# Patient Record
Sex: FEMALE | Race: WHITE | NOT HISPANIC OR LATINO | URBAN - METROPOLITAN AREA
[De-identification: names, ages, dates, MRNs, and addresses within clinical notes are randomized per-mention and may not be internally consistent; named-entity substitution may affect disease eponyms.]

---

## 2019-11-11 ENCOUNTER — EMERGENCY (EMERGENCY)
Facility: HOSPITAL | Age: 74
LOS: 0 days | Discharge: HOME | End: 2019-11-12
Attending: EMERGENCY MEDICINE | Admitting: EMERGENCY MEDICINE
Payer: MEDICAID

## 2019-11-11 VITALS
OXYGEN SATURATION: 99 % | SYSTOLIC BLOOD PRESSURE: 184 MMHG | HEART RATE: 71 BPM | DIASTOLIC BLOOD PRESSURE: 82 MMHG | RESPIRATION RATE: 20 BRPM | TEMPERATURE: 98 F

## 2019-11-11 DIAGNOSIS — I10 ESSENTIAL (PRIMARY) HYPERTENSION: ICD-10-CM

## 2019-11-11 DIAGNOSIS — R07.89 OTHER CHEST PAIN: ICD-10-CM

## 2019-11-11 DIAGNOSIS — Z88.8 ALLERGY STATUS TO OTHER DRUGS, MEDICAMENTS AND BIOLOGICAL SUBSTANCES STATUS: ICD-10-CM

## 2019-11-11 DIAGNOSIS — R11.0 NAUSEA: ICD-10-CM

## 2019-11-11 DIAGNOSIS — Z90.710 ACQUIRED ABSENCE OF BOTH CERVIX AND UTERUS: Chronic | ICD-10-CM

## 2019-11-11 DIAGNOSIS — Z88.2 ALLERGY STATUS TO SULFONAMIDES: ICD-10-CM

## 2019-11-11 DIAGNOSIS — E03.9 HYPOTHYROIDISM, UNSPECIFIED: ICD-10-CM

## 2019-11-11 DIAGNOSIS — R07.9 CHEST PAIN, UNSPECIFIED: ICD-10-CM

## 2019-11-11 DIAGNOSIS — R42 DIZZINESS AND GIDDINESS: ICD-10-CM

## 2019-11-11 DIAGNOSIS — R06.02 SHORTNESS OF BREATH: ICD-10-CM

## 2019-11-11 DIAGNOSIS — R51 HEADACHE: ICD-10-CM

## 2019-11-11 LAB
ALBUMIN SERPL ELPH-MCNC: 4.4 G/DL — SIGNIFICANT CHANGE UP (ref 3.5–5.2)
ALP SERPL-CCNC: 66 U/L — SIGNIFICANT CHANGE UP (ref 30–115)
ALT FLD-CCNC: 10 U/L — SIGNIFICANT CHANGE UP (ref 0–41)
ANION GAP SERPL CALC-SCNC: 12 MMOL/L — SIGNIFICANT CHANGE UP (ref 7–14)
AST SERPL-CCNC: 12 U/L — SIGNIFICANT CHANGE UP (ref 0–41)
BILIRUB SERPL-MCNC: 0.4 MG/DL — SIGNIFICANT CHANGE UP (ref 0.2–1.2)
BUN SERPL-MCNC: 17 MG/DL — SIGNIFICANT CHANGE UP (ref 10–20)
CALCIUM SERPL-MCNC: 9.7 MG/DL — SIGNIFICANT CHANGE UP (ref 8.5–10.1)
CHLORIDE SERPL-SCNC: 100 MMOL/L — SIGNIFICANT CHANGE UP (ref 98–110)
CO2 SERPL-SCNC: 29 MMOL/L — SIGNIFICANT CHANGE UP (ref 17–32)
CREAT SERPL-MCNC: 0.6 MG/DL — LOW (ref 0.7–1.5)
GLUCOSE SERPL-MCNC: 148 MG/DL — HIGH (ref 70–99)
HCT VFR BLD CALC: 41.7 % — SIGNIFICANT CHANGE UP (ref 37–47)
HGB BLD-MCNC: 13.4 G/DL — SIGNIFICANT CHANGE UP (ref 12–16)
MAGNESIUM SERPL-MCNC: 1.7 MG/DL — LOW (ref 1.8–2.4)
MCHC RBC-ENTMCNC: 27 PG — SIGNIFICANT CHANGE UP (ref 27–31)
MCHC RBC-ENTMCNC: 32.1 G/DL — SIGNIFICANT CHANGE UP (ref 32–37)
MCV RBC AUTO: 84.1 FL — SIGNIFICANT CHANGE UP (ref 81–99)
NRBC # BLD: 0 /100 WBCS — SIGNIFICANT CHANGE UP (ref 0–0)
NT-PROBNP SERPL-SCNC: 31 PG/ML — SIGNIFICANT CHANGE UP (ref 0–300)
PLATELET # BLD AUTO: 189 K/UL — SIGNIFICANT CHANGE UP (ref 130–400)
POTASSIUM SERPL-MCNC: 4.6 MMOL/L — SIGNIFICANT CHANGE UP (ref 3.5–5)
POTASSIUM SERPL-SCNC: 4.6 MMOL/L — SIGNIFICANT CHANGE UP (ref 3.5–5)
PROT SERPL-MCNC: 7.6 G/DL — SIGNIFICANT CHANGE UP (ref 6–8)
RBC # BLD: 4.96 M/UL — SIGNIFICANT CHANGE UP (ref 4.2–5.4)
RBC # FLD: 13.5 % — SIGNIFICANT CHANGE UP (ref 11.5–14.5)
SODIUM SERPL-SCNC: 141 MMOL/L — SIGNIFICANT CHANGE UP (ref 135–146)
TROPONIN T SERPL-MCNC: <0.01 NG/ML — SIGNIFICANT CHANGE UP
WBC # BLD: 4.35 K/UL — LOW (ref 4.8–10.8)
WBC # FLD AUTO: 4.35 K/UL — LOW (ref 4.8–10.8)

## 2019-11-11 PROCEDURE — 93010 ELECTROCARDIOGRAM REPORT: CPT

## 2019-11-11 PROCEDURE — 71275 CT ANGIOGRAPHY CHEST: CPT | Mod: 26

## 2019-11-11 PROCEDURE — 99220: CPT

## 2019-11-11 PROCEDURE — 71045 X-RAY EXAM CHEST 1 VIEW: CPT | Mod: 26

## 2019-11-11 PROCEDURE — 70450 CT HEAD/BRAIN W/O DYE: CPT | Mod: 26

## 2019-11-11 RX ORDER — DIPHENHYDRAMINE HCL 50 MG
50 CAPSULE ORAL ONCE
Refills: 0 | Status: COMPLETED | OUTPATIENT
Start: 2019-11-11 | End: 2019-11-11

## 2019-11-11 RX ORDER — ASPIRIN/CALCIUM CARB/MAGNESIUM 324 MG
325 TABLET ORAL ONCE
Refills: 0 | Status: COMPLETED | OUTPATIENT
Start: 2019-11-11 | End: 2019-11-11

## 2019-11-11 RX ORDER — MAGNESIUM SULFATE 500 MG/ML
2 VIAL (ML) INJECTION ONCE
Refills: 0 | Status: COMPLETED | OUTPATIENT
Start: 2019-11-11 | End: 2019-11-11

## 2019-11-11 RX ADMIN — Medication 50 MILLIGRAM(S): at 22:18

## 2019-11-11 RX ADMIN — Medication 50 GRAM(S): at 22:00

## 2019-11-11 RX ADMIN — Medication 325 MILLIGRAM(S): at 23:16

## 2019-11-11 NOTE — ED PROVIDER NOTE - PROGRESS NOTE DETAILS
Resident Price Statement: I have personally seen and examined this patient. I have fully participated in the care of this patient. I have reviewed all pertinent clinical information, including history, physical exam, plan and the Medical Student's note and agree except as noted.

## 2019-11-11 NOTE — ED ADULT NURSE NOTE - OBJECTIVE STATEMENT
Pt c/o headache, dizziness, left sided facial numbness x 2 days. Pt alert/ oriented. No neuro defecits noted. Pt AOX4.

## 2019-11-11 NOTE — ED PROVIDER NOTE - PHYSICAL EXAMINATION
CONSTITUTIONAL: Well-developed; well-nourished; in no acute distress.   SKIN: warm, dry  HEAD: Normocephalic; atraumatic.  EYES: no conjunctival injection. PERRL.   ENT: No nasal discharge; airway clear.  NECK: Supple; non tender. No JVD or tracheal deviation.   CARD: S1, S2 normal; no murmurs, gallops, or rubs. Regular rate and rhythm.   RESP: No wheezes, rales or rhonchi.  ABD: soft ntnd, no guarding, no distension or rigidity   EXT: Ambulates independently. Able to move all 4 limbs.   LYMPH: No acute cervical adenopathy.  NEURO: Alert, oriented, grossly unremarkable. Cranial nerves II-XII intact. Neuro exam normal. Muscle strength 5/5 in upper and lower extremities.   PSYCH: Cooperative, appropriate.

## 2019-11-11 NOTE — ED PROVIDER NOTE - ATTENDING CONTRIBUTION TO CARE
Declines , uses daughters. 73yoF with h/o HTN, HLD, hypothyr, presents with CP/SOB and dizziness. States she has been having years of L sided chest sharpness/heaviness radiating to back, now for a few days is worsened with exertion. Associated BELLO. Also reports vertigo with onset at the same time as her CP. Nonsmoker, uncertain FHx of CAD though none of her siblings have CAD, never had cardiac w/u and no PMD here. Denies vomiting, leg pain or swelling, fever, or any other symptoms. On exam, afebrile, hemodynamically stable, saturating well, NAD, well appearing, head NCAT, PERRL, EOMI, no nystagmus, anicteric, MMM, no JVD, RRR, nml S1/S2, no m/r/g, lungs CTAB, no w/r/r, abd soft, NT, ND, nml BS, no rebound or guarding, AAO, CN's 3-12 intact, motor 5/5 and sens symm in all extrems, F-N/Romberg's/gait nml, HOWARD spontaneously, no leg cyanosis or edema, skin warm, well perfused, no rashes or hives. Character low suspicion for PE and no associated signs of DVT. Character/appearance of low suspicion for dissection and CTA _______________. Character also low suspicion for central process and no focal or localizing signs, NIH 0. No e/o PTX or PNA. Character of some concern for ACS, HEART score 3, will obs for concern for ACS. Declines , uses daughters. 73yoF with h/o HTN, HLD, hypothyr, presents with CP/SOB and dizziness. States she has been having years of L sided chest sharpness/heaviness radiating to back, now for a few days is worsened with exertion. Associated BELLO. Also reports vertigo with onset at the same time as her CP. Nonsmoker, uncertain FHx of CAD though none of her siblings have CAD, never had cardiac w/u and no PMD here. Denies vomiting, leg pain or swelling, fever, or any other symptoms. On exam, afebrile, hemodynamically stable, saturating well, NAD, well appearing, head NCAT, PERRL, EOMI, no nystagmus, anicteric, MMM, no JVD, RRR, nml S1/S2, no m/r/g, lungs CTAB, no w/r/r, abd soft, NT, ND, nml BS, no rebound or guarding, AAO, CN's 3-12 intact, motor 5/5 and sens symm in all extrems, F-N/Romberg's/gait nml, HOWARD spontaneously, no leg cyanosis or edema, skin warm, well perfused, no rashes or hives. Character low suspicion for PE and no associated signs of DVT. Character/appearance of low suspicion for dissection and CTA negative. Character also low suspicion for central process and no focal or localizing signs, NIH 0. No e/o PTX or PNA. Character of some concern for ACS, HEART score 3, will obs for concern for ACS. Patient well appearing, hemodynamically stable. Given ASA. Sent to obs.

## 2019-11-11 NOTE — ED PROVIDER NOTE - NS ED ROS FT
CONSTITUTIONAL - No fever, No diaphoresis, No weight change  EYES - No blurred vision  ENT - No change in hearing, No sore throat, No neck pain, No rhinorrhea, No ear pain  RESPIRATORY  + shortness of breath, No cough  CARDIAC + chest pain, No palpitations  GI - No abdominal pain, No nausea, No vomiting, No diarrhea, No constipation, No flank pain  - No dysuria, frequency  MUSCULOSKELETAL - No joint paint, No swelling, No back pain  NEUROLOGIC + numbness left half of upper and lower lips, No focal weakness, + headache, + dizziness

## 2019-11-11 NOTE — ED CDU PROVIDER INITIAL DAY NOTE - OBJECTIVE STATEMENT
74y/o female with pmh of hypothyroidism, htn, pt. c/o left sided exertional cp radiating to the back and associated with sob and dizziness for several days. pt. also c/o headache for a month. denies fever, chills, cough, vomiting, abdominal pain. pt. never smoker, no cardiologist. no prior cardiac workup.

## 2019-11-11 NOTE — ED CDU PROVIDER INITIAL DAY NOTE - MEDICAL DECISION MAKING DETAILS
Patient to remain on continuous telemetry.  For repeat cardiac enzymes and repeat EKG.  Provocative testing in the AM.

## 2019-11-11 NOTE — ED PROVIDER NOTE - CLINICAL SUMMARY MEDICAL DECISION MAKING FREE TEXT BOX
Declines , uses daughters. 73yoF with h/o HTN, HLD, hypothyr, presents with CP/SOB and dizziness. States she has been having years of L sided chest sharpness/heaviness radiating to back, now for a few days is worsened with exertion. Associated BELLO. Also reports vertigo with onset at the same time as her CP. Nonsmoker, uncertain FHx of CAD though none of her siblings have CAD, never had cardiac w/u and no PMD here. Denies vomiting, leg pain or swelling, fever, or any other symptoms. On exam, afebrile, hemodynamically stable, saturating well, NAD, well appearing, head NCAT, PERRL, EOMI, no nystagmus, anicteric, MMM, no JVD, RRR, nml S1/S2, no m/r/g, lungs CTAB, no w/r/r, abd soft, NT, ND, nml BS, no rebound or guarding, AAO, CN's 3-12 intact, motor 5/5 and sens symm in all extrems, F-N/Romberg's/gait nml, HOWARD spontaneously, no leg cyanosis or edema, skin warm, well perfused, no rashes or hives. Character low suspicion for PE and no associated signs of DVT. Character/appearance of low suspicion for dissection and CTA negative. Character also low suspicion for central process and no focal or localizing signs, NIH 0. No e/o PTX or PNA. Character of some concern for ACS, HEART score 3, will obs for concern for ACS. Patient well appearing, hemodynamically stable. Given ASA. Sent to obs.

## 2019-11-11 NOTE — ED PROVIDER NOTE - OBJECTIVE STATEMENT
73 year old female with past medical history of hypothyroidism and HTN presents to the ED c/o of 1 month headache which has been increasing in intensity over the past 2 days and chest pain which has been going on for 2 days and is present at rest. Patient states the headache is localized to the top of her head and had caused her to feel very "dizzy and faint". She has taken advil which has provided minimal relief. Chest pain is rated as 7/10 and radiates to her left shoulder and left scapula. ROS is also positive for persistent SOB which has been going on for one year and intermittent brief left upper and left lower lip numbness which has been going on for at least one year. Denies recent fevers, chills, cough, diarrhea, prolonged immobilization, or recent travel. Social hx - denies tobacco, alcohol, or recreational drug usage. 73 year old female with past medical history of hypothyroidism and HTN presents to the ED c/o of 1 month headache which has been increasing in intensity over the past 2 days and chest pain which has been going on for 2 days and is worse with exertion. Patient states the headache is localized to the top of her head and had caused her to feel very "dizzy and faint". She has taken advil which has provided minimal relief. Chest pain is rated as 7/10 and radiates to her left shoulder and left scapula. ROS is also positive for persistent SOB which has been going on for one year and intermittent brief left upper and left lower lip numbness which has been going on for at least one year. Denies recent fevers, chills, cough, diarrhea, prolonged immobilization, or recent travel. Social hx - denies tobacco, alcohol, or recreational drug usage.

## 2019-11-11 NOTE — ED CDU PROVIDER INITIAL DAY NOTE - NEURO NEGATIVE STATEMENT, MLM
no loss of consciousness, no gait abnormality, + headache, + dizziness, no sensory deficits, and no weakness.

## 2019-11-12 VITALS
SYSTOLIC BLOOD PRESSURE: 131 MMHG | OXYGEN SATURATION: 96 % | RESPIRATION RATE: 18 BRPM | TEMPERATURE: 97 F | DIASTOLIC BLOOD PRESSURE: 64 MMHG | HEART RATE: 72 BPM

## 2019-11-12 LAB — TROPONIN T SERPL-MCNC: <0.01 NG/ML — SIGNIFICANT CHANGE UP

## 2019-11-12 PROCEDURE — 99217: CPT

## 2019-11-12 PROCEDURE — 93016 CV STRESS TEST SUPVJ ONLY: CPT

## 2019-11-12 PROCEDURE — 93018 CV STRESS TEST I&R ONLY: CPT

## 2019-11-12 PROCEDURE — 78452 HT MUSCLE IMAGE SPECT MULT: CPT | Mod: 26

## 2019-11-12 RX ORDER — ADENOSINE 3 MG/ML
60 INJECTION INTRAVENOUS ONCE
Refills: 0 | Status: COMPLETED | OUTPATIENT
Start: 2019-11-12 | End: 2019-11-12

## 2019-11-12 RX ADMIN — ADENOSINE 600 MILLIGRAM(S): 3 INJECTION INTRAVENOUS at 09:44

## 2019-11-12 NOTE — ED ADULT NURSE REASSESSMENT NOTE - NS ED NURSE REASSESS COMMENT FT1
Patient A&Ox4. VSS. Denies any pain or discomfort at this time. Family at bedside for translation, patient speaks Luxembourger- refuses  at this time. Patient waiting for NM test. CM in place. IV intact- shows no signs of redness or swelling. Will continue to monitor.
Pt remained asleep. Family at bedside. No changes from previous assessment. Will monitor.
Received pt alert, oriented x 3, claimed feels much better than when she came in. Daughter at bedside to translate. Will monitor.

## 2019-11-12 NOTE — ED CDU PROVIDER DISPOSITION NOTE - CLINICAL COURSE
No events on telemetry.  Nuclear stress testing negative for ischemia.  Patient discharged to follow up with PMD.  Continue all meds as prescribed.  Return instructions discussed.

## 2019-11-12 NOTE — ED CDU PROVIDER DISPOSITION NOTE - CARE PROVIDER_API CALL
Dina Simms)  Internal Medicine  [address] [place] [address]  Phone: [phone]  Fax: [phone]  Follow Up Time:     Prakash Reid)  Cardiovascular Disease; Internal Medicine; Interventional Cardiology  [address] [place] [address] [place] [address]  Phone: [phone]  Fax: [phone]  Follow Up Time:

## 2019-11-12 NOTE — ED CDU PROVIDER SUBSEQUENT DAY NOTE - PROGRESS NOTE DETAILS
pt. in nad, resting comfortably, pt. remains on cardiac monitor. trops wnl x2. will continue to reassess. Patient feeling well this AM; She is at nuclear stress testing now

## 2019-11-12 NOTE — ED CDU PROVIDER DISPOSITION NOTE - NSFOLLOWUPINSTRUCTIONS_ED_ALL_ED_FT
Follow up with primary care physician / cardiologist.    Nonspecific Chest Pain  Chest pain can be caused by many different conditions. There is always a chance that your pain could be related to something serious, such as a heart attack or a blood clot in your lungs. Chest pain can also be caused by conditions that are not life-threatening. If you have chest pain, it is very important to follow up with your health care provider.    What are the causes?  Causes of this condition include:    Heartburn.  Pneumonia or bronchitis.  Anxiety or stress.  Inflammation around your heart (pericarditis) or lung (pleuritis or pleurisy).  A blood clot in your lung.  A collapsed lung (pneumothorax). This can develop suddenly on its own (spontaneous pneumothorax) or from trauma to the chest.  Shingles infection (varicella-zoster virus).  Heart attack.  Damage to the bones, muscles, and cartilage that make up your chest wall. This can include:    Bruised bones due to injury.  Strained muscles or cartilage due to frequent or repeated coughing or overwork.  Fracture to one or more ribs.  Sore cartilage due to inflammation (costochondritis).      What increases the risk?  Risk factors for this condition may include:    Activities that increase your risk for trauma or injury to your chest.  Respiratory infections or conditions that cause frequent coughing.  Medical conditions or overeating that can cause heartburn.  Heart disease or family history of heart disease.  Conditions or health behaviors that increase your risk of developing a blood clot.  Having had chicken pox (varicella zoster).    What are the signs or symptoms?  Chest pain can feel like:    Burning or tingling on the surface of your chest or deep in your chest.  Crushing, pressure, aching, or squeezing pain.  Dull or sharp pain that is worse when you move, cough, or take a deep breath.  Pain that is also felt in your back, neck, shoulder, or arm, or pain that spreads to any of these areas.    Your chest pain may come and go, or it may stay constant.    How is this diagnosed?  Lab tests or other studies may be needed to find the cause of your pain. Your health care provider may have you take a test called an ECG (electrocardiogram). An ECG records your heartbeat patterns at the time the test is performed. You may also have other tests, such as:    Transthoracic echocardiogram (TTE). In this test, sound waves are used to create a picture of the heart structures and to look at how blood flows through your heart.  Transesophageal echocardiogram (BENNY). This is a more advanced imaging test that takes images from inside your body. It allows your health care provider to see your heart in finer detail.  Cardiac monitoring. This allows your health care provider to monitor your heart rate and rhythm in real time.  Holter monitor. This is a portable device that records your heartbeat and can help to diagnose abnormal heartbeats. It allows your health care provider to track your heart activity for several days, if needed.  Stress tests. These can be done through exercise or by taking medicine that makes your heart beat more quickly.  Blood tests.  Other imaging tests.    How is this treated?  Treatment depends on what is causing your chest pain. Treatment may include:    Medicines. These may include:    Acid blockers for heartburn.  Anti-inflammatory medicine.  Pain medicine for inflammatory conditions.  Antibiotic medicine, if an infection is present.  Medicines to dissolve blood clots.  Medicines to treat coronary artery disease (CAD).    Supportive care for conditions that do not require medicines. This may include:    Resting.  Applying heat or cold packs to injured areas.  Limiting activities until pain decreases.      Follow these instructions at home:  Medicines     If you were prescribed an antibiotic, take it as told by your health care provider. Do not stop taking the antibiotic even if you start to feel better.  Take over-the-counter and prescription medicines only as told by your health care provider.  Lifestyle     Do not use any products that contain nicotine or tobacco, such as cigarettes and e-cigarettes. If you need help quitting, ask your health care provider.  Do not drink alcohol.  ImageMake lifestyle changes as directed by your health care provider. These may include:    Getting regular exercise. Ask your health care provider to suggest some activities that are safe for you.  Eating a heart-healthy diet. A registered dietitian can help you to learn healthy eating options.  Maintaining a healthy weight.  Managing diabetes, if necessary.  Reducing stress, such as with yoga or relaxation techniques.    General instructions     Avoid any activities that bring on chest pain.  If heartburn is the cause for your chest pain, raise (elevate) the head of your bed about 6 inches (15 cm) by putting blocks under the legs. Sleeping with more pillows does not effectively relieve heartburn because it only changes the position of your head.  Keep all follow-up visits as told by your health care provider. This is important. This includes any further testing if your chest pain does not go away.  Contact a health care provider if:  Your chest pain does not go away.  You have a rash with blisters on your chest.  You have a fever.  You have chills.  Get help right away if:  Your chest pain is worse.  You have a cough that gets worse, or you cough up blood.  You have severe pain in your abdomen.  You have severe weakness.  You faint.  You have sudden, unexplained chest discomfort.  You have sudden, unexplained discomfort in your arms, back, neck, or jaw.  You have shortness of breath at any time.  You suddenly start to sweat, or your skin gets clammy.  You feel nauseous or you vomit.  You suddenly feel light-headed or dizzy.  Your heart begins to beat quickly, or it feels like it is skipping beats.  These symptoms may represent a serious problem that is an emergency. Do not wait to see if the symptoms will go away. Get medical help right away. Call your local emergency services (911 in the [place] Do not drive yourself to the hospital.     This information is not intended to replace advice given to you by your health care provider. Make sure you discuss any questions you have with your health care provider.

## 2019-11-12 NOTE — ED CDU PROVIDER DISPOSITION NOTE - PATIENT PORTAL LINK FT
You can access the FollowMyHealth Patient Portal offered by [facility] by registering at the following website: [email or web address]. By joining [employer]’s FollowMyHealth portal, you will also be able to view your health information using other applications (apps) compatible with our system.

## 2020-09-09 ENCOUNTER — OUTPATIENT (OUTPATIENT)
Dept: OUTPATIENT SERVICES | Facility: HOSPITAL | Age: 75
LOS: 1 days | Discharge: HOME | End: 2020-09-09
Payer: MEDICAID

## 2020-09-09 DIAGNOSIS — M79.671 PAIN IN RIGHT FOOT: ICD-10-CM

## 2020-09-09 DIAGNOSIS — Z90.710 ACQUIRED ABSENCE OF BOTH CERVIX AND UTERUS: Chronic | ICD-10-CM

## 2020-09-09 DIAGNOSIS — M25.579 PAIN IN UNSPECIFIED ANKLE AND JOINTS OF UNSPECIFIED FOOT: ICD-10-CM

## 2020-09-09 PROBLEM — E03.9 HYPOTHYROIDISM, UNSPECIFIED: Chronic | Status: ACTIVE | Noted: 2019-11-11

## 2020-09-09 PROBLEM — I10 ESSENTIAL (PRIMARY) HYPERTENSION: Chronic | Status: ACTIVE | Noted: 2019-11-11

## 2020-09-09 PROCEDURE — 73630 X-RAY EXAM OF FOOT: CPT | Mod: 26,RT

## 2020-09-09 PROCEDURE — 73610 X-RAY EXAM OF ANKLE: CPT | Mod: 26,RT

## 2020-09-09 PROCEDURE — 93971 EXTREMITY STUDY: CPT | Mod: 26,RT

## 2021-07-19 ENCOUNTER — EMERGENCY (EMERGENCY)
Facility: HOSPITAL | Age: 76
LOS: 0 days | Discharge: HOME | End: 2021-07-19
Attending: EMERGENCY MEDICINE | Admitting: EMERGENCY MEDICINE
Payer: MEDICAID

## 2021-07-19 VITALS
TEMPERATURE: 98 F | RESPIRATION RATE: 18 BRPM | WEIGHT: 205.03 LBS | HEART RATE: 62 BPM | OXYGEN SATURATION: 98 % | DIASTOLIC BLOOD PRESSURE: 64 MMHG | SYSTOLIC BLOOD PRESSURE: 146 MMHG

## 2021-07-19 DIAGNOSIS — Z90.710 ACQUIRED ABSENCE OF BOTH CERVIX AND UTERUS: Chronic | ICD-10-CM

## 2021-07-19 DIAGNOSIS — I10 ESSENTIAL (PRIMARY) HYPERTENSION: ICD-10-CM

## 2021-07-19 DIAGNOSIS — Z88.8 ALLERGY STATUS TO OTHER DRUGS, MEDICAMENTS AND BIOLOGICAL SUBSTANCES: ICD-10-CM

## 2021-07-19 DIAGNOSIS — M54.5 LOW BACK PAIN: ICD-10-CM

## 2021-07-19 DIAGNOSIS — E03.9 HYPOTHYROIDISM, UNSPECIFIED: ICD-10-CM

## 2021-07-19 DIAGNOSIS — Z90.710 ACQUIRED ABSENCE OF BOTH CERVIX AND UTERUS: ICD-10-CM

## 2021-07-19 DIAGNOSIS — Z88.2 ALLERGY STATUS TO SULFONAMIDES: ICD-10-CM

## 2021-07-19 PROCEDURE — 99284 EMERGENCY DEPT VISIT MOD MDM: CPT

## 2021-07-19 RX ORDER — OXYCODONE AND ACETAMINOPHEN 5; 325 MG/1; MG/1
1 TABLET ORAL ONCE
Refills: 0 | Status: DISCONTINUED | OUTPATIENT
Start: 2021-07-19 | End: 2021-07-19

## 2021-07-19 RX ADMIN — OXYCODONE AND ACETAMINOPHEN 1 TABLET(S): 5; 325 TABLET ORAL at 17:13

## 2021-07-19 NOTE — ED PROVIDER NOTE - CLINICAL SUMMARY MEDICAL DECISION MAKING FREE TEXT BOX
74 yo F presented to Ed of sciatic back pain. No new neurological symptoms. Pt improved with medication. DC home with rehab and pain management and strict return precautions.

## 2021-07-19 NOTE — ED PROVIDER NOTE - CARE PROVIDER_API CALL
Mykel Jordan)  Neuromuscular Medicine  [address] [place] [address], [address]  [place], [place] [id number]  Phone: [phone]  Fax: [phone]  Follow Up Time:

## 2021-07-19 NOTE — ED PROVIDER NOTE - ATTENDING CONTRIBUTION TO CARE
76 yo F presents to ED for sciatic back pain. Pt states the back pain started last Monday. She went to an ED in [place] where they did a full workup including CT scan and prescribed her tylenol and cyclobenzaprine. However, pt states she is still having the pain. Last time she took the Tylenol and Cyclobenzaprine was this morning. Pain is worse with movement and laying down. No worsening numbness or tingling. No bladder or bowel incontinence or retention.   No SOB, CP, trauma, fevers or chills.     Const: Well nourished, well developed, appears stated age  Eyes: PERRL, no conjunctival injection  HENT:  Neck supple without meningismus   CV: RRR, Warm, well-perfused extremities  RESP: CTA B/L, no tachypnea   MSK: No gross deformities appreciated. No c-spine, t-spine or l-spine tenderness or stepoffs   Skin: Warm, dry. No rashes  Neuro: Alert, CNs II-XII grossly intact. Sensation and motor function of extremities grossly intact.  Psych: Appropriate mood and affect.    Pain control

## 2021-07-19 NOTE — ED PROVIDER NOTE - PATIENT PORTAL LINK FT
You can access the FollowMyHealth Patient Portal offered by [facility] by registering at the following website: [email or web address]. By joining [facility]’s FollowMyHealth portal, you will also be able to view your health information using other applications (apps) compatible with our system.

## 2021-07-19 NOTE — ED PROVIDER NOTE - PHYSICAL EXAMINATION
CONST: Well appearing in NAD  EYES: Sclera and conjunctiva clear.   ENT: No nasal discharge. Oropharynx normal appearing, no erythema or exudates. No abscess or swelling. Uvula midline.   NECK: Non-tender, no meningeal signs. normal ROM. supple   CARD: S1 S2; No jvd  RESP: Equal BS B/L, No wheezes, rhonchi or rales. No distress  GI: Soft, non-tender, non-distended. no cva tenderness. normal BS  MS: Reproducible R lumbar paraspinal tenderness. Normal ROM in all extremities. pulses 2 +. no calf tenderness or swelling  SKIN: Warm, dry, no acute rashes. Good turgor  NEURO: A&Ox3, No focal deficits. Strength 5/5 with no sensory deficits. Steady gait.

## 2021-07-19 NOTE — ED ADULT TRIAGE NOTE - CHIEF COMPLAINT QUOTE
severe lower back pain since last Wednesday, decreased ambulation since the start of pain, denies fall and trauma

## 2021-07-19 NOTE — ED PROVIDER NOTE - OBJECTIVE STATEMENT
75y F pmh HTN, Hypothyroid presents for eval of back pain. Pt has intermittent moderate aching R lower back pain radiating down her leg x1wk, aggravated with ambulation, no relieving factors. Was evaluated at hospital in [place] with CT wnl and eval of sciatica. Denies fever, ha, cp, sob, weakness, numbness, dysuria, hematuria, n/v/d/c

## 2021-07-19 NOTE — ED PROVIDER NOTE - NSFOLLOWUPINSTRUCTIONS_ED_ALL_ED_FT
Follow up with PMD and Rehab in 1-2 days.    Back Pain    Back pain is very common in adults. The cause of back pain is rarely dangerous and the pain often gets better over time. The cause of your back pain may not be known and may include strain of muscles or ligaments, degeneration of the spinal disks, or arthritis. Occasionally the pain may radiate down your leg(s). Over-the-counter medicines to reduce pain and inflammation are often the most helpful. Stretching and remaining active frequently helps the healing process.     SEEK IMMEDIATE MEDICAL CARE IF YOU HAVE ANY OF THE FOLLOWING SYMPTOMS: bowel or bladder control problems, unusual weakness or numbness in your arms or legs, nausea or vomiting, abdominal pain, fever, dizziness/lightheadedness.

## 2021-07-19 NOTE — ED PROVIDER NOTE - NS ED ROS FT
Constitutional: (-) fever  Eyes/ENT: (-) blurry vision, (-) epistaxis  Cardiovascular: (-) chest pain, (-) syncope  Respiratory: (-) cough, (-) shortness of breath  Gastrointestinal: (-) vomiting, (-) diarrhea  : (-) incontinence, (-) dysuria, hematuria  Musculoskeletal: (+) back pain, (-) neck pain, (-) joint pain  Integumentary: (-) rash, (-) edema  Neurological: (-) headache, (-) altered mental status  Allergic/Immunologic: (-) pruritus

## 2021-07-19 NOTE — ED PROVIDER NOTE - NSFOLLOWUPCLINICS_GEN_ALL_ED_FT
[facility] ([facility])  Rehabilitation  [facility] [address], [address] [place] [address]  Phone: [phone]  Fax:

## 2021-07-23 PROBLEM — Z00.00 ENCOUNTER FOR PREVENTIVE HEALTH EXAMINATION: Status: ACTIVE | Noted: 2021-07-23

## 2021-08-06 ENCOUNTER — APPOINTMENT (OUTPATIENT)
Dept: NEUROLOGY | Facility: CLINIC | Age: 76
End: 2021-08-06
Payer: MEDICAID

## 2021-08-06 VITALS
SYSTOLIC BLOOD PRESSURE: 156 MMHG | WEIGHT: 210 LBS | HEART RATE: 73 BPM | DIASTOLIC BLOOD PRESSURE: 89 MMHG | TEMPERATURE: 96 F | HEIGHT: 62 IN | BODY MASS INDEX: 38.64 KG/M2 | OXYGEN SATURATION: 98 %

## 2021-08-06 PROCEDURE — 99214 OFFICE O/P EST MOD 30 MIN: CPT

## 2021-08-06 RX ORDER — MECLIZINE HYDROCHLORIDE 12.5 MG/1
12.5 TABLET ORAL
Refills: 0 | Status: ACTIVE | COMMUNITY

## 2021-08-06 RX ORDER — DONEPEZIL HYDROCHLORIDE 5 MG/1
5 TABLET ORAL
Refills: 0 | Status: ACTIVE | COMMUNITY

## 2021-09-14 ENCOUNTER — OUTPATIENT (OUTPATIENT)
Dept: OUTPATIENT SERVICES | Facility: HOSPITAL | Age: 76
LOS: 1 days | Discharge: HOME | End: 2021-09-14
Payer: MEDICAID

## 2021-09-14 ENCOUNTER — RESULT REVIEW (OUTPATIENT)
Age: 76
End: 2021-09-14

## 2021-09-14 DIAGNOSIS — Z90.710 ACQUIRED ABSENCE OF BOTH CERVIX AND UTERUS: Chronic | ICD-10-CM

## 2021-09-14 DIAGNOSIS — M54.10 RADICULOPATHY, SITE UNSPECIFIED: ICD-10-CM

## 2021-09-14 PROCEDURE — 72141 MRI NECK SPINE W/O DYE: CPT | Mod: 26

## 2021-09-14 PROCEDURE — 72148 MRI LUMBAR SPINE W/O DYE: CPT | Mod: 26

## 2021-09-16 ENCOUNTER — APPOINTMENT (OUTPATIENT)
Dept: NEUROLOGY | Facility: CLINIC | Age: 76
End: 2021-09-16
Payer: MEDICAID

## 2021-09-16 ENCOUNTER — NON-APPOINTMENT (OUTPATIENT)
Age: 76
End: 2021-09-16

## 2021-09-16 VITALS
BODY MASS INDEX: 39.56 KG/M2 | OXYGEN SATURATION: 98 % | HEIGHT: 62 IN | DIASTOLIC BLOOD PRESSURE: 83 MMHG | SYSTOLIC BLOOD PRESSURE: 147 MMHG | TEMPERATURE: 97.2 F | HEART RATE: 68 BPM | WEIGHT: 215 LBS

## 2021-09-16 PROCEDURE — 99213 OFFICE O/P EST LOW 20 MIN: CPT

## 2021-09-16 NOTE — DISCUSSION/SUMMARY
[FreeTextEntry1] : Patient is 75y F, Greenlandic speaking, with PMHx of HTN, Hypothyroidism, obesity who presents as ED follow up for back pain during ED visit on 7/19/21. Exam today is significant for decreased sensation to vibration and LT on RLE, B/l UE proximal weakness 4+/5 and b/l LE weakness 4/5 on the right 4+/5 on the left. Patient is unable to tolerate closed MRI and will try to do open MRI. Informed to avoid stand up or sitting MRI. \par \par PLAN:\par -MRI c-spine and L-spine \par -If needed, will order EMG \par -Lidocaine patches \par -C/w Tylenol \par -Advised on weight loss\par -Follow up in 1 month \par

## 2021-09-16 NOTE — PHYSICAL EXAM
[FreeTextEntry1] : Focal neurological exam:\par \par MS: Awake, alert, oriented to person, place, situation and time. Normal affect. Follows commands. \par \par Language: Speech is clear, fluent with good repetition & comprehension. No dysarthria. \par \par CNs 2 - 12 intact. EOMI no nystagmus, no diplopia. VFF. No facial asymmetry b/l, full eye closure strength b/l. Hearing grossly normal. Head turning & shoulder shrug intact b/l. Tongue midline, normal movements, no atrophy.\par \par Motor: Normal muscle bulk & tone. No noticeable tremor or seizure. No pronator drift. Muscle strength of b/l UE 4+/5 and RLE 4/5, LLE 4+/5.  \par \par Reflexes: DTR of biceps 2+, knees 2+  \par \par Sensation: Decreased sensation to LR and vibration on RLE\par \par Cortical: No extinction\par \par Coordination: No dysmetria to FTN.\par \par Gait: Right antalgic gait \par \par \par \par \par

## 2021-09-16 NOTE — REASON FOR VISIT
[Follow-Up: _____] : a [unfilled] follow-up visit [Family Member] : family member [FreeTextEntry1] :  follow up for back pain during ED visit on 7/19/21.

## 2021-09-16 NOTE — HISTORY OF PRESENT ILLNESS
[FreeTextEntry1] : Patient is 75y F, Divehi speaking, with PMHx of HTN, Hypothyroidism, obesity who presents as ED follow up for back pain during ED visit on 7/19/21.  She c/o of intermittent moderate aching R lower back pain radiating down her leg x1wk, aggravated with ambulation, no relieving factors. She was evaluated at a hospital in [place] with normal CT and being evaluated for sciatica. No imaging was done during ED visit. CT L spine w/o contrast showed negative for trauma or fracture. There is spondylosis without high grade bony canal stenosis.  \par \par Today, patients presents with daughter and c/o pain in Right lower back going down the Right leg. It's preventing her from walking, sleeping and sitting. She's never injured that side of the back. She cannot take pain meds because of allergies and kidney issues. She is on Tylenol and muscle relaxers. She can't tolerate the muscle relaxers because it prevents her from walking to the bathroom. She is severely allergic to cortisone, anaphylactic. She is getting PT, but is unable to tolerate because of pain. \par \par \par

## 2021-09-20 NOTE — HISTORY OF PRESENT ILLNESS
[FreeTextEntry1] : Patient is 75y F, Maltese speaking, with PMHx of HTN, Hypothyroidism, obesity who presents as follow up for back pain, was in ED on 7/19/21. She was last seen in clinic on 8/6/21. Exam was significant for decreased sensation to vibration and LT on RLE, B/l UE proximal weakness 4+/5 and b/l LE weakness 4/5 on the right 4+/5 on the left. MRI c-spine reported C5-6 degenerative changes with mild/moderate spinal canal and b/l foraminal narrowing. There are mid disc protrusions throughout. The MRI L-spine reports mild degenerative changes with mild foraminal narrowing at L4-5 and L5-S1.  There is bone marrow signal abnormality/edema within L5 left pedicle compatible with a nondisplaced fracture vs pedicle stress reaction.  \par \par Today, patient presents with son and is getting physical therapy, which she says is helpful.  She continues to have difficulty standing for more than five minutes and cannot walk without a cane. Once she stands up, she needs to bend over. She is declining to use Robaxin or NSAIDs. She says the lidocaine patches are helpful \par

## 2021-09-20 NOTE — DISCUSSION/SUMMARY
[FreeTextEntry1] : Patient is 75y F, Azeri speaking, with PMHx of HTN, Hypothyroidism, obesity who presents as follow up for back pain, was in ED on 7/19/21. She was last seen in clinic on 8/6/21. MRI c-spine reported C5-6 degenerative changes with mild/moderate spinal canal and b/l foraminal narrowing. There are mid disc protrusions throughout. The MRI L-spine reports mild degenerative changes with mild foraminal narrowing at L4-5 and L5-S1.  There is bone marrow signal abnormality/edema within L5 left pedicle compatible with a nondisplaced fracture vs pedicle stress reaction. Today, patient presents with son and is getting physical therapy, which she says is helpful. Exam is unchanged.  \par \par PLAN:\par -Referral to Occupational Therapy \par -Referral to pain management\par -X-ray c-spine flexion/extension prior to UE/neck PT\par -X-ray t-spine \par -Diclofenac cream/Lidocaine patches \par -Advised on weight loss \par -Follow up with one of the attendings in 3-4 months

## 2021-09-20 NOTE — REASON FOR VISIT
[Follow-Up: _____] : a [unfilled] follow-up visit [Family Member] : family member [FreeTextEntry1] : follow up for back pain, was in ED on 7/19/21

## 2021-09-20 NOTE — PHYSICAL EXAM
[FreeTextEntry1] : Focal neurological exam:\par \par MS: Awake, alert, oriented to person, place, situation and time. Normal affect. Follows commands. \par \par Language: Speech is clear, fluent with good repetition & comprehension. No dysarthria. \par \par CNs 2 - 12 intact. EOMI no nystagmus, no diplopia. VFF. No facial asymmetry b/l, full eye closure strength b/l. Hearing grossly normal. Head turning & shoulder shrug intact b/l. Tongue midline, normal movements, no atrophy.\par \par Motor: Normal muscle bulk & tone. No noticeable tremor or seizure. No pronator drift. Muscle strength of b/l UE 4+/5 and RLE 4/5, LLE 4+/5. \par \par Reflexes: DTR of biceps 2+, knees 2+ \par \par Sensation: Decreased sensation to LR and vibration on RLE\par \par Cortical: No extinction\par \par Coordination: No dysmetria to FTN.\par \par Gait: Gait is improved

## 2021-09-29 ENCOUNTER — NON-APPOINTMENT (OUTPATIENT)
Age: 76
End: 2021-09-29

## 2021-09-29 DIAGNOSIS — M67.919 UNSPECIFIED DISORDER OF SYNOVIUM AND TENDON, UNSPECIFIED SHOULDER: ICD-10-CM

## 2021-11-04 ENCOUNTER — OUTPATIENT (OUTPATIENT)
Dept: OUTPATIENT SERVICES | Facility: HOSPITAL | Age: 76
LOS: 1 days | Discharge: HOME | End: 2021-11-04
Payer: MEDICAID

## 2021-11-04 DIAGNOSIS — R10.2 PELVIC AND PERINEAL PAIN: ICD-10-CM

## 2021-11-04 DIAGNOSIS — Z90.710 ACQUIRED ABSENCE OF BOTH CERVIX AND UTERUS: Chronic | ICD-10-CM

## 2021-11-04 DIAGNOSIS — N28.81 HYPERTROPHY OF KIDNEY: ICD-10-CM

## 2021-11-04 DIAGNOSIS — M10.9 GOUT, UNSPECIFIED: ICD-10-CM

## 2021-11-04 PROCEDURE — 74181 MRI ABDOMEN W/O CONTRAST: CPT | Mod: 26

## 2021-11-04 PROCEDURE — 72195 MRI PELVIS W/O DYE: CPT | Mod: 26

## 2021-12-02 ENCOUNTER — APPOINTMENT (OUTPATIENT)
Dept: NEUROLOGY | Facility: CLINIC | Age: 76
End: 2021-12-02
Payer: MEDICAID

## 2021-12-02 VITALS
BODY MASS INDEX: 36.25 KG/M2 | WEIGHT: 197 LBS | DIASTOLIC BLOOD PRESSURE: 82 MMHG | HEART RATE: 60 BPM | TEMPERATURE: 97.8 F | SYSTOLIC BLOOD PRESSURE: 144 MMHG | HEIGHT: 62 IN | OXYGEN SATURATION: 98 %

## 2021-12-02 DIAGNOSIS — R20.0 ANESTHESIA OF SKIN: ICD-10-CM

## 2021-12-02 DIAGNOSIS — Z60.2 PROBLEMS RELATED TO LIVING ALONE: ICD-10-CM

## 2021-12-02 DIAGNOSIS — R20.2 PARESTHESIA OF SKIN: ICD-10-CM

## 2021-12-02 PROCEDURE — 99215 OFFICE O/P EST HI 40 MIN: CPT

## 2021-12-02 SDOH — SOCIAL STABILITY - SOCIAL INSECURITY: PROBLEMS RELATED TO LIVING ALONE: Z60.2

## 2021-12-02 NOTE — DISCUSSION/SUMMARY
[FreeTextEntry1] : Pt is a 77 yo F with PMHx HTN, hypothyroidism, who is being evaluated for neck/back/joint pain, RLE lumbar radicular pain, and new c/o intermittent left UE/LE numbness and perioral/lingual numbness. R/o stroke for new symptoms (low suspicion however given repetitive frequent nature, improves with neck manipulation). MRI brain without contrast. Awaiting EMG/NCS. check labs, BMP, ESR, CRP, DESIREE, ANCA< RF, SS ab. Referral to Rheumatology. PT referral. Trial of GBP 300mg BID. Encouraged f/u wit orthopedic and pain speciality as well as previously prescribed. RTC in 3 mo.

## 2021-12-02 NOTE — REVIEW OF SYSTEMS
[Feeling Poorly] : feeling poorly [As Noted in HPI] : as noted in HPI [Arthralgias] : arthralgias [Joint Pain] : joint pain [Negative] : Heme/Lymph

## 2021-12-02 NOTE — PHYSICAL EXAM
[General Appearance - Alert] : alert [General Appearance - In No Acute Distress] : in no acute distress [General Appearance - Well Nourished] : well nourished [General Appearance - Well Developed] : well developed [Oriented To Time, Place, And Person] : oriented to person, place, and time [Affect] : the affect was normal [Person] : oriented to person [Place] : oriented to place [Time] : oriented to time [Fluency] : fluency intact [Comprehension] : comprehension intact [Cranial Nerves Optic (II)] : visual acuity intact bilaterally,  visual fields full to confrontation, pupils equal round and reactive to light [Cranial Nerves Oculomotor (III)] : extraocular motion intact [Cranial Nerves Trigeminal (V)] : facial sensation intact symmetrically [Cranial Nerves Facial (VII)] : face symmetrical [Cranial Nerves Vestibulocochlear (VIII)] : hearing was intact bilaterally [Cranial Nerves Glossopharyngeal (IX)] : tongue and palate midline [Cranial Nerves Accessory (XI - Cranial And Spinal)] : head turning and shoulder shrug symmetric [Cranial Nerves Hypoglossal (XII)] : there was no tongue deviation with protrusion [Motor Tone] : muscle tone was normal in all four extremities [Paresis Pronator Drift Right-Sided] : no pronator drift on the right [Paresis Pronator Drift Left-Sided] : no pronator drift on the left [2+] : Patella left 2+ [FreeTextEntry6] : BUE 4+/5, pain/cooperation limited exam of BLE. only lifting antigravity, however pt is able to bear weight and ambulate with cane [FreeTextEntry7] : decreased light touch sensation in RLE< otherwise intact [FreeTextEntry8] : antalgic gait [Sclera] : the sclera and conjunctiva were normal [PERRL With Normal Accommodation] : pupils were equal in size, round, reactive to light, with normal accommodation [Outer Ear] : the ears and nose were normal in appearance [Hearing Threshold Finger Rub Not [place]] : hearing was normal [Neck Appearance] : the appearance of the neck was normal [] : no respiratory distress [Respiration, Rhythm And Depth] : normal respiratory rhythm and effort [Heart Rate And Rhythm] : heart rate was normal and rhythm regular [Heart Sounds] : normal S1 and S2 [Arterial Pulses Carotid] : carotid pulses were normal with no bruits [Musculoskeletal - Swelling] : no joint swelling seen [Skin Turgor] : normal skin turgor

## 2021-12-02 NOTE — HISTORY OF PRESENT ILLNESS
[FreeTextEntry1] : Pt is a 75 yo F with PMHx HTN, hypothyroidism,who presents for f/u. Danish speaking, son is present and willing to interpret. She was following with [facility] for c/o neck/back pain and RLE paresthesia. She continues to endorse shooting pain down the back of her right leg, lower back pain and neck pain. She was doing well while working with physical therapy but hasn't kept up with it at home and is starting to feel like she is declining again. She trips often while walking, unable to lift her legs due to back pain. She has not followed up with ortho or pain specialty. She also endorses numbness around her mouth and tongue, as well as intermittent frequent left arm/leg numbness and tingling. Denies h/o stroke.

## 2022-01-03 ENCOUNTER — RESULT REVIEW (OUTPATIENT)
Age: 77
End: 2022-01-03

## 2022-01-03 ENCOUNTER — OUTPATIENT (OUTPATIENT)
Dept: OUTPATIENT SERVICES | Facility: HOSPITAL | Age: 77
LOS: 1 days | Discharge: HOME | End: 2022-01-03
Payer: MEDICAID

## 2022-01-03 DIAGNOSIS — R20.0 ANESTHESIA OF SKIN: ICD-10-CM

## 2022-01-03 DIAGNOSIS — Z90.710 ACQUIRED ABSENCE OF BOTH CERVIX AND UTERUS: Chronic | ICD-10-CM

## 2022-01-03 PROCEDURE — 70551 MRI BRAIN STEM W/O DYE: CPT | Mod: 26

## 2022-01-28 ENCOUNTER — LABORATORY RESULT (OUTPATIENT)
Age: 77
End: 2022-01-28

## 2022-01-28 ENCOUNTER — APPOINTMENT (OUTPATIENT)
Dept: NEUROLOGY | Facility: CLINIC | Age: 77
End: 2022-01-28
Payer: MEDICAID

## 2022-01-28 VITALS — TEMPERATURE: 98 F | WEIGHT: 197 LBS | BODY MASS INDEX: 36.25 KG/M2 | HEIGHT: 62 IN | OXYGEN SATURATION: 98 %

## 2022-01-28 VITALS — HEART RATE: 62 BPM | DIASTOLIC BLOOD PRESSURE: 81 MMHG | SYSTOLIC BLOOD PRESSURE: 149 MMHG

## 2022-01-28 DIAGNOSIS — M54.10 RADICULOPATHY, SITE UNSPECIFIED: ICD-10-CM

## 2022-01-28 DIAGNOSIS — M25.519 PAIN IN UNSPECIFIED SHOULDER: ICD-10-CM

## 2022-01-28 DIAGNOSIS — M54.2 CERVICALGIA: ICD-10-CM

## 2022-01-28 PROCEDURE — 99214 OFFICE O/P EST MOD 30 MIN: CPT

## 2022-01-28 NOTE — PHYSICAL EXAM
[FreeTextEntry1] : Focal neurological exam:\par \par MS: Awake, alert, oriented to person, place, situation and time. Normal affect. Follows commands. \par \par Language: Speech is clear, fluent with good repetition & comprehension. No dysarthria. \par \par CNs 2 - 12 intact. EOMI no nystagmus, no diplopia. VFF. No facial asymmetry b/l, full eye closure strength b/l. Hearing grossly normal. Head turning & shoulder shrug intact b/l. Tongue midline, normal movements, no atrophy.\par \par Motor: Normal muscle bulk & tone. No noticeable tremor or seizure. No pronator drift. Muscle strength of b/l UE 4+/5 and RLE 4+/5, LLE 4+/5. \par \par Reflexes: DTR of biceps 2+, knees 2+ \par \par Sensation: Symmetric to LT and vibration today. \par \par Cortical: No extinction\par \par Coordination: No dysmetria to FTN.\par \par Gait: R antalgic gait.

## 2022-01-28 NOTE — ASSESSMENT
[FreeTextEntry1] : Pt is a 77 yo F with PMHx HTN, hypothyroidism, who is being followed for neck/back/joint pain associated with numbness and hypoesthesia more on the R. Since she saw Dr. Carl in 12/2021 she c/o of perioral/lingual numbness, which may be related to her cervical issues (trigeminal brainstem nucleus pathway/spinal sensory trigeminal tract?). Dr. Carl also noted the sx were repetitive frequent nature and improved with neck manipulation. MRI H from 1/2022 negative for stroke or any other pathology. My concern is that the results of her MRI imaging are nor comparative to her sx. Her sx seem worse, so I'd like have EMG done earlier. \par \par PLAN:\par -Renew referrals for OT and PT \par -Please f/u with pain mgmt and orthopedics\par -X-ray of Left shoulder \par -Compression stockings for orthostatic dizziness\par -Please due Rheum blood work \par -Will move EMG up, prior to Dr. Carl's appt \par -C/w weight loss, make sure hypothyroidism is controlled \par -Follow up in 4-6 months, after EMG is completed

## 2022-01-28 NOTE — REASON FOR VISIT
[Follow-Up: _____] : a [unfilled] follow-up visit [Family Member] : family member [FreeTextEntry1] : for cervical and lumbar radiculopathy

## 2022-01-28 NOTE — HISTORY OF PRESENT ILLNESS
[FreeTextEntry1] : Pt is a 75 yo F with PMHx HTN, hypothyroidism, who is being followed for neck/back/joint pain associated with numbness and hypoesthesia. She saw Dr. Carl 12/2/21 and at that time c/o of intermittent left UE/LE numbness and perioral/lingual numbness. Dr. Carl wanted to rule out stroke despite low suspicion bc repetitive frequent nature and improves with neck manipulation. Dr. Carl also sent her for Rheum work up.  When she saw me, and had MRI of c-spine and l-spine, findings on imaging did not seem comparable to her complaints and deficits, so I wanted her to do an EMG, which is pending for 9/2022 and needs to be moved up.  \par \par Since, MRI H 1/5/22 negative for any pathology. \par \par Today, patient presents with son and daughter and has the same complaints. She did not do Rheum BW, f/u w/ ortho or pain mgmt, and did not want to take Gabapentin. PT and OT have been helpful and they are requesting more sessions. She says that when she stand up, she gets pain down her leg and has difficulty walking because of the back pain. She also has some dizziness with positional changes that has been chronic and she doesn't like to wear her compression stockings. She also has Left shoulder pain now w/ hx of shoulder injury there. \par \par \par \par

## 2022-02-02 LAB
ANACR T: NEGATIVE
ANION GAP SERPL CALC-SCNC: 11 MMOL/L
BUN SERPL-MCNC: 16 MG/DL
CALCIUM SERPL-MCNC: 9.8 MG/DL
CHLORIDE SERPL-SCNC: 103 MMOL/L
CO2 SERPL-SCNC: 28 MMOL/L
CREAT SERPL-MCNC: 0.7 MG/DL
CRP SERPL-MCNC: 7 MG/L
ERYTHROCYTE [SEDIMENTATION RATE] IN BLOOD BY WESTERGREN METHOD: 30 MM/HR
GLUCOSE SERPL-MCNC: 125 MG/DL
POTASSIUM SERPL-SCNC: 4.7 MMOL/L
SODIUM SERPL-SCNC: 142 MMOL/L

## 2022-02-07 LAB
ENA SS-A AB SER IA-ACNC: <0.2 AL
ENA SS-B AB SER IA-ACNC: <0.2 AL

## 2022-07-22 ENCOUNTER — APPOINTMENT (OUTPATIENT)
Dept: NEUROLOGY | Facility: CLINIC | Age: 77
End: 2022-07-22

## 2022-07-22 VITALS
WEIGHT: 199 LBS | TEMPERATURE: 97.8 F | SYSTOLIC BLOOD PRESSURE: 127 MMHG | BODY MASS INDEX: 36.62 KG/M2 | HEART RATE: 67 BPM | HEIGHT: 62 IN | OXYGEN SATURATION: 99 % | DIASTOLIC BLOOD PRESSURE: 81 MMHG

## 2022-07-22 PROCEDURE — 99214 OFFICE O/P EST MOD 30 MIN: CPT

## 2022-07-22 RX ORDER — GABAPENTIN 300 MG/1
300 CAPSULE ORAL TWICE DAILY
Qty: 60 | Refills: 3 | Status: DISCONTINUED | COMMUNITY
Start: 2021-12-02 | End: 2022-07-22

## 2022-07-22 RX ORDER — DICYCLOMINE HYDROCHLORIDE 20 MG/1
TABLET ORAL
Refills: 0 | Status: DISCONTINUED | COMMUNITY
End: 2022-07-22

## 2022-07-22 RX ORDER — BLOOD SUGAR DIAGNOSTIC
STRIP MISCELLANEOUS
Qty: 100 | Refills: 0 | Status: ACTIVE | COMMUNITY
Start: 2022-07-18

## 2022-07-22 RX ORDER — DICLOFENAC SODIUM 1% 10 MG/G
1 GEL TOPICAL DAILY
Qty: 1 | Refills: 0 | Status: DISCONTINUED | COMMUNITY
Start: 2021-09-16 | End: 2022-07-22

## 2022-07-22 RX ORDER — OXYBUTYNIN CHLORIDE 5 MG/1
5 TABLET, EXTENDED RELEASE ORAL
Refills: 0 | Status: ACTIVE | COMMUNITY

## 2022-07-22 RX ORDER — CALCIUM CARBONATE/VITAMIN D3 500 MG-10
TABLET ORAL
Refills: 0 | Status: ACTIVE | COMMUNITY

## 2022-07-22 RX ORDER — NITROFURANTOIN (MONOHYDRATE/MACROCRYSTALS) 25; 75 MG/1; MG/1
100 CAPSULE ORAL
Refills: 0 | Status: ACTIVE | COMMUNITY

## 2022-07-22 RX ORDER — LEVOTHYROXINE SODIUM 0.03 MG/1
25 TABLET ORAL
Refills: 0 | Status: DISCONTINUED | COMMUNITY
End: 2022-07-22

## 2022-07-22 RX ORDER — CYCLOBENZAPRINE HYDROCHLORIDE 5 MG/1
5 TABLET, FILM COATED ORAL
Refills: 0 | Status: ACTIVE | COMMUNITY

## 2022-07-22 RX ORDER — METFORMIN HYDROCHLORIDE 500 MG/1
500 TABLET, COATED ORAL
Refills: 0 | Status: ACTIVE | COMMUNITY

## 2022-07-22 NOTE — PHYSICAL EXAM
[General Appearance - Alert] : alert [General Appearance - In No Acute Distress] : in no acute distress [General Appearance - Well Nourished] : well nourished [General Appearance - Well Developed] : well developed [Oriented To Time, Place, And Person] : oriented to person, place, and time [Person] : oriented to person [Place] : oriented to place [Time] : oriented to time [Naming Objects] : no difficulty naming common objects [Fluency] : fluency intact [Comprehension] : comprehension intact [Cranial Nerves Optic (II)] : visual acuity intact bilaterally,  visual fields full to confrontation, pupils equal round and reactive to light [Cranial Nerves Oculomotor (III)] : extraocular motion intact [Cranial Nerves Trigeminal (V)] : facial sensation intact symmetrically [Cranial Nerves Facial (VII)] : face symmetrical [Cranial Nerves Vestibulocochlear (VIII)] : hearing was intact bilaterally [Cranial Nerves Glossopharyngeal (IX)] : tongue and palate midline [Cranial Nerves Accessory (XI - Cranial And Spinal)] : head turning and shoulder shrug symmetric [Cranial Nerves Hypoglossal (XII)] : there was no tongue deviation with protrusion [Motor Strength] : muscle strength was normal in all four extremities [Motor Tone] : muscle tone was normal in all four extremities [Paresis Pronator Drift Right-Sided] : no pronator drift on the right [Paresis Pronator Drift Left-Sided] : no pronator drift on the left [Motor Strength Upper Extremities Bilaterally] : strength was normal in both upper extremities [Motor Strength Lower Extremities Bilaterally] : strength was normal in both lower extremities [Coordination - Dysmetria Impaired Finger-to-Nose Bilateral] : not present [2+] : Patella left 2+ [FreeTextEntry7] : very mild loss of light touch in bilateral legs  [Sclera] : the sclera and conjunctiva were normal [PERRL With Normal Accommodation] : pupils were equal in size, round, reactive to light, with normal accommodation [Outer Ear] : the ears and nose were normal in appearance [Hearing Threshold Finger Rub Not [place]] : hearing was normal [Neck Appearance] : the appearance of the neck was normal [] : no respiratory distress [Respiration, Rhythm And Depth] : normal respiratory rhythm and effort [Heart Rate And Rhythm] : heart rate was normal and rhythm regular [Heart Sounds] : normal S1 and S2 [Arterial Pulses Carotid] : carotid pulses were normal with no bruits [Abnormal Walk] : normal gait [FreeTextEntry1] : pain with any manipulation of wrists, elbow, knees.  [Skin Turgor] : normal skin turgor

## 2022-07-22 NOTE — HISTORY OF PRESENT ILLNESS
[FreeTextEntry1] : Interval History:\par Pt endorsing numbness around her mouth, in bilateral hands and feet. She has tingling and burning/itchy sensation in her feet in the evening, difficulty sleeping. Denies restless sensation, does not improve with movement. \par \par HPI: Pt is a 75 yo F with PMHx HTN, hypothyroidism,who presents for f/u. Yoruba speaking, son is present and willing to interpret. She was following with [facility] for c/o neck/back pain and RLE paresthesia. She continues to endorse shooting pain down the back of her right leg, lower back pain and neck pain. She was doing well while working with physical therapy but hasn't kept up with it at home and is starting to feel like she is declining again. She trips often while walking, unable to lift her legs due to back pain. She has not followed up with ortho or pain specialty. She also endorses numbness around her mouth and tongue, as well as intermittent frequent left arm/leg numbness and tingling. Denies h/o stroke.

## 2022-07-22 NOTE — DISCUSSION/SUMMARY
[FreeTextEntry1] : Pt is a 75 yo F with PMHx HTN, hypothyroidism, who is being evaluated for neck/back/joint pain, RLE lumbar radicular pain, and new c/o intermittent left UE/LE numbness and perioral/lingual numbness.\par - Vit D low and B12 borderline. 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

## 2022-07-22 NOTE — REVIEW OF SYSTEMS
[As Noted in HPI] : as noted in HPI [Eyesight Problems] : eyesight problems [Arthralgias] : arthralgias [Joint Pain] : joint pain [Negative] : Heme/Lymph [FreeTextEntry4] : difficulty swallowing

## 2022-09-09 ENCOUNTER — OUTPATIENT (OUTPATIENT)
Dept: OUTPATIENT SERVICES | Facility: HOSPITAL | Age: 77
LOS: 1 days | Discharge: HOME | End: 2022-09-09

## 2022-09-09 DIAGNOSIS — Z90.710 ACQUIRED ABSENCE OF BOTH CERVIX AND UTERUS: Chronic | ICD-10-CM

## 2022-09-09 DIAGNOSIS — Z12.31 ENCOUNTER FOR SCREENING MAMMOGRAM FOR MALIGNANT NEOPLASM OF BREAST: ICD-10-CM

## 2022-09-12 DIAGNOSIS — N95.9 UNSPECIFIED MENOPAUSAL AND PERIMENOPAUSAL DISORDER: ICD-10-CM

## 2022-09-12 DIAGNOSIS — Z13.820 ENCOUNTER FOR SCREENING FOR OSTEOPOROSIS: ICD-10-CM

## 2022-09-19 ENCOUNTER — APPOINTMENT (OUTPATIENT)
Dept: NEUROLOGY | Facility: CLINIC | Age: 77
End: 2022-09-19

## 2022-10-26 ENCOUNTER — RESULT REVIEW (OUTPATIENT)
Age: 77
End: 2022-10-26

## 2022-10-26 ENCOUNTER — OUTPATIENT (OUTPATIENT)
Dept: OUTPATIENT SERVICES | Facility: HOSPITAL | Age: 77
LOS: 1 days | Discharge: HOME | End: 2022-10-26

## 2022-10-26 ENCOUNTER — APPOINTMENT (OUTPATIENT)
Dept: ORTHOPEDIC SURGERY | Facility: CLINIC | Age: 77
End: 2022-10-26

## 2022-10-26 DIAGNOSIS — Z90.710 ACQUIRED ABSENCE OF BOTH CERVIX AND UTERUS: Chronic | ICD-10-CM

## 2022-10-26 DIAGNOSIS — M25.561 PAIN IN RIGHT KNEE: ICD-10-CM

## 2022-10-26 PROCEDURE — 73564 X-RAY EXAM KNEE 4 OR MORE: CPT | Mod: 26,RT

## 2022-11-04 ENCOUNTER — OUTPATIENT (OUTPATIENT)
Dept: OUTPATIENT SERVICES | Facility: HOSPITAL | Age: 77
LOS: 1 days | Discharge: HOME | End: 2022-11-04

## 2022-11-04 DIAGNOSIS — Z12.31 ENCOUNTER FOR SCREENING MAMMOGRAM FOR MALIGNANT NEOPLASM OF BREAST: ICD-10-CM

## 2022-11-04 DIAGNOSIS — Z90.710 ACQUIRED ABSENCE OF BOTH CERVIX AND UTERUS: Chronic | ICD-10-CM

## 2022-11-04 PROCEDURE — 77063 BREAST TOMOSYNTHESIS BI: CPT | Mod: 26

## 2022-11-04 PROCEDURE — 77067 SCR MAMMO BI INCL CAD: CPT | Mod: 26

## 2022-11-19 ENCOUNTER — OUTPATIENT (OUTPATIENT)
Dept: OUTPATIENT SERVICES | Facility: HOSPITAL | Age: 77
LOS: 1 days | Discharge: HOME | End: 2022-11-19

## 2022-11-19 DIAGNOSIS — Z90.710 ACQUIRED ABSENCE OF BOTH CERVIX AND UTERUS: Chronic | ICD-10-CM

## 2022-11-19 DIAGNOSIS — R92.8 OTHER ABNORMAL AND INCONCLUSIVE FINDINGS ON DIAGNOSTIC IMAGING OF BREAST: ICD-10-CM

## 2022-11-19 PROCEDURE — 77065 DX MAMMO INCL CAD UNI: CPT | Mod: 26,LT

## 2022-11-19 PROCEDURE — G0279: CPT | Mod: 26

## 2022-11-23 RX ORDER — GABAPENTIN 100 MG/1
100 CAPSULE ORAL
Qty: 30 | Refills: 3 | Status: ACTIVE | COMMUNITY
Start: 2022-07-22 | End: 1900-01-01

## 2022-11-28 ENCOUNTER — OUTPATIENT (OUTPATIENT)
Dept: OUTPATIENT SERVICES | Facility: HOSPITAL | Age: 77
LOS: 1 days | Discharge: HOME | End: 2022-11-28

## 2022-11-28 ENCOUNTER — RESULT REVIEW (OUTPATIENT)
Age: 77
End: 2022-11-28

## 2022-11-28 ENCOUNTER — TRANSCRIPTION ENCOUNTER (OUTPATIENT)
Age: 77
End: 2022-11-28

## 2022-11-28 DIAGNOSIS — Z90.710 ACQUIRED ABSENCE OF BOTH CERVIX AND UTERUS: Chronic | ICD-10-CM

## 2022-11-28 PROCEDURE — 88305 TISSUE EXAM BY PATHOLOGIST: CPT | Mod: 26

## 2022-11-28 PROCEDURE — 76098 X-RAY EXAM SURGICAL SPECIMEN: CPT | Mod: 26

## 2022-11-28 PROCEDURE — 19081 BX BREAST 1ST LESION STRTCTC: CPT | Mod: LT

## 2022-11-30 LAB — SURGICAL PATHOLOGY STUDY: SIGNIFICANT CHANGE UP

## 2022-12-01 ENCOUNTER — APPOINTMENT (OUTPATIENT)
Dept: NEUROLOGY | Facility: CLINIC | Age: 77
End: 2022-12-01

## 2022-12-01 DIAGNOSIS — D24.2 BENIGN NEOPLASM OF LEFT BREAST: ICD-10-CM

## 2022-12-01 DIAGNOSIS — R92.1 MAMMOGRAPHIC CALCIFICATION FOUND ON DIAGNOSTIC IMAGING OF BREAST: ICD-10-CM

## 2022-12-09 ENCOUNTER — RESULT REVIEW (OUTPATIENT)
Age: 77
End: 2022-12-09

## 2022-12-09 ENCOUNTER — OUTPATIENT (OUTPATIENT)
Dept: OUTPATIENT SERVICES | Facility: HOSPITAL | Age: 77
LOS: 1 days | Discharge: HOME | End: 2022-12-09

## 2022-12-09 ENCOUNTER — APPOINTMENT (OUTPATIENT)
Dept: RHEUMATOLOGY | Facility: CLINIC | Age: 77
End: 2022-12-09

## 2022-12-09 VITALS
SYSTOLIC BLOOD PRESSURE: 139 MMHG | HEIGHT: 62 IN | DIASTOLIC BLOOD PRESSURE: 72 MMHG | TEMPERATURE: 97.2 F | OXYGEN SATURATION: 100 % | BODY MASS INDEX: 34.78 KG/M2 | HEART RATE: 60 BPM | WEIGHT: 189 LBS

## 2022-12-09 DIAGNOSIS — Z90.710 ACQUIRED ABSENCE OF BOTH CERVIX AND UTERUS: Chronic | ICD-10-CM

## 2022-12-09 DIAGNOSIS — M25.50 PAIN IN UNSPECIFIED JOINT: ICD-10-CM

## 2022-12-09 PROCEDURE — 73120 X-RAY EXAM OF HAND: CPT | Mod: 26,50

## 2022-12-09 PROCEDURE — 73030 X-RAY EXAM OF SHOULDER: CPT | Mod: 26,50

## 2022-12-09 PROCEDURE — 99204 OFFICE O/P NEW MOD 45 MIN: CPT | Mod: GC

## 2022-12-09 RX ORDER — MELOXICAM 7.5 MG/1
7.5 TABLET ORAL DAILY
Qty: 60 | Refills: 0 | Status: ACTIVE | COMMUNITY
Start: 2022-12-09 | End: 1900-01-01

## 2022-12-09 NOTE — PHYSICAL EXAM
[General Appearance - Alert] : alert [General Appearance - In No Acute Distress] : in no acute distress [Sclera] : the sclera and conjunctiva were normal [PERRL With Normal Accommodation] : pupils were equal in size, round, and reactive to light [Extraocular Movements] : extraocular movements were intact [Outer Ear] : the ears and nose were normal in appearance [Oropharynx] : the oropharynx was normal [Neck Appearance] : the appearance of the neck was normal [Neck Cervical Mass (___cm)] : no neck mass was observed [Jugular Venous Distention Increased] : there was no jugular-venous distention [Thyroid Diffuse Enlargement] : the thyroid was not enlarged [Thyroid Nodule] : there were no palpable thyroid nodules [Auscultation Breath Sounds / Voice Sounds] : lungs were clear to auscultation bilaterally [Heart Rate And Rhythm] : heart rate was normal and rhythm regular [Heart Sounds] : normal S1 and S2 [Heart Sounds Gallop] : no gallops [Murmurs] : no murmurs [Heart Sounds Pericardial Friction Rub] : no pericardial rub [Bowel Sounds] : normal bowel sounds [Abdomen Soft] : soft [Abdomen Tenderness] : non-tender [Abdomen Mass (___ Cm)] : no abdominal mass palpated [Abnormal Walk] : normal gait [Nail Clubbing] : no clubbing  or cyanosis of the fingernails [Involuntary Movements] : no involuntary movements were seen [Musculoskeletal - Swelling] : no joint swelling seen [Motor Tone] : muscle strength and tone were normal [FreeTextEntry1] : Crepitus in bilateral knees. Limited passive ROM and abduction bilateral shoulders [] : no rash [Affect] : the affect was normal [Mood] : the mood was normal

## 2022-12-09 NOTE — HISTORY OF PRESENT ILLNESS
[FreeTextEntry1] : 77 year old female with a history of HTN, dementia, vertigo, depression, hypothyroidism who presents for evaluation of joint pains.\par \par Son is translating. \par \par Patient reports joint pains for many years but recently it's progressed. Reports pain in right side of body > left. She has pain in hands, wrists, elbows, shoulders, knees, foot, ankle described as burning in joints, its hard to grasp objects and lift her utensils to eat, open and closing her hands is more difficult, hard to hold cane, muscles are weak when using a walker, heat and cold cause sensation of pins and needles in her joints.  +AM stiffness,  if she sits for 1 hour it's hard to get up and move, especially knee and back. She went to PT for knees and back that helped. When sitting she needs to hold her joints due to pain. + swelling right wrist and 2nd finger. Tylenol gives partial relief. +Numbness with pins and needs in arms, and legs, dizziness, pain over body, weakness, lower face pain in jaw\par \par Denies skin rashes, mucositis, raynauds, +dry eyes, respiratory complaints, chest pains, GI complaints.

## 2022-12-09 NOTE — ASSESSMENT
[FreeTextEntry1] : Arthritis\par -patient with signs and symptoms concerning for osteoarthritis. She has prolonged AM stiffness and subjective swelling but no signs or synovitis on physical exam. Her worst pain is her knees, hands, neck, back and shoulders. \par -MR cervical spine and lumbar spine from 9/2021 show degenerative changes\par -DESIREE, Sjgorens, ESR, CRP unremarkable\par -Check x-ray hands and shoulders\par -Check RF, CCP, ESR, CRP\par -Send to PT neck, hands, shoulders, back, and knees\par -Start meloxicam 7.5 mg/day PRN\par -F/u 3 months\par

## 2022-12-15 LAB
CCP AB SER IA-ACNC: <8 UNITS
CRP SERPL-MCNC: 4 MG/L
ERYTHROCYTE [SEDIMENTATION RATE] IN BLOOD BY WESTERGREN METHOD: 30 MM/HR
RF+CCP IGG SER-IMP: NEGATIVE
RHEUMATOID FACT SER QL: <10 IU/ML

## 2022-12-20 DIAGNOSIS — M25.50 PAIN IN UNSPECIFIED JOINT: ICD-10-CM

## 2022-12-20 DIAGNOSIS — M19.90 UNSPECIFIED OSTEOARTHRITIS, UNSPECIFIED SITE: ICD-10-CM

## 2022-12-23 ENCOUNTER — APPOINTMENT (OUTPATIENT)
Dept: UROLOGY | Facility: CLINIC | Age: 77
End: 2022-12-23

## 2022-12-23 VITALS
WEIGHT: 189 LBS | TEMPERATURE: 98 F | DIASTOLIC BLOOD PRESSURE: 90 MMHG | RESPIRATION RATE: 14 BRPM | BODY MASS INDEX: 34.78 KG/M2 | OXYGEN SATURATION: 100 % | HEIGHT: 62 IN | HEART RATE: 70 BPM | SYSTOLIC BLOOD PRESSURE: 140 MMHG

## 2022-12-23 DIAGNOSIS — E07.9 DISORDER OF THYROID, UNSPECIFIED: ICD-10-CM

## 2022-12-23 DIAGNOSIS — G62.9 POLYNEUROPATHY, UNSPECIFIED: ICD-10-CM

## 2022-12-23 DIAGNOSIS — N28.1 CYST OF KIDNEY, ACQUIRED: ICD-10-CM

## 2022-12-23 DIAGNOSIS — Z78.9 OTHER SPECIFIED HEALTH STATUS: ICD-10-CM

## 2022-12-23 LAB
BILIRUB UR QL STRIP: NORMAL
COLLECTION METHOD: NORMAL
GLUCOSE UR-MCNC: NORMAL
HCG UR QL: 0.2 EU/DL
HGB UR QL STRIP.AUTO: NORMAL
KETONES UR-MCNC: NORMAL
LEUKOCYTE ESTERASE UR QL STRIP: NORMAL
NITRITE UR QL STRIP: NORMAL
PH UR STRIP: 6
PROT UR STRIP-MCNC: NORMAL
SP GR UR STRIP: 1.02

## 2022-12-23 PROCEDURE — 99203 OFFICE O/P NEW LOW 30 MIN: CPT

## 2022-12-23 PROCEDURE — 81003 URINALYSIS AUTO W/O SCOPE: CPT | Mod: QW

## 2022-12-23 NOTE — PHYSICAL EXAM
[General Appearance - Well Nourished] : well nourished [Normal Appearance] : normal appearance [General Appearance - In No Acute Distress] : no acute distress [Abdomen Soft] : soft [Abdomen Tenderness] : non-tender [Oriented To Time, Place, And Person] : oriented to person, place, and time [FreeTextEntry1] : pt seems to be anxious

## 2022-12-23 NOTE — HISTORY OF PRESENT ILLNESS
[FreeTextEntry1] : 76 y/o female who comes for an evaluation of kidney stones without renal colic. She has a hx of stones about 40 years ago that required some sort of procedure. Pt also has a hx of renal cysts. There is an MRI that shows a partly complex left renal cyst measuring 4.5 x 3.5 cm which was limited by some motion article. Pt denies any gross hematuria, but does have some occasional dysuria. She has no definitive hx of UTIs. She also complains of occasional slow stream and having to double void. Pt has some sig back related problems. All information was obtained through the pts daughter who accompanied her today.\par \par pmhx: anxiety, HTN, back pain, thyroid disease\par \par pshx: appendectomy, kidney stone procedure \par \par allergies: IV contrast material

## 2022-12-23 NOTE — ASSESSMENT
[FreeTextEntry1] : 76 y/o female with a complex left renal cyst. A repeat MRI would be preferable, but being that pt has an IV contrast allergy and says they cannot have any type of contrast, a renal and bladder US was suggested instead. We discussed her overactive blader and slow stream and how it may be related to many factors. She was previously on antimuscarinics and I strongly encouraged that she stop taking them due to the side effects. Pt had a variety of questions regarding bone issues that would need to be answered through her PCP and nephrology. All her other questions were address appropriately and answered. She will follow up after her renal and bladder US and we will make further decisions from there. Total time=30 min.\par \par The submitted E/M billing level for this visit reflects the total time spent on the day of the visit including face-to-face time spent with the patient, non-face-to-face review of medical records and relevant information, documentation, and asynchronous communication with the patient after a visit via phone, email, or patient’s EHR portal after the visit. \par The medical records reviewed are either scanned into the chart or reviewed with the patient using a patient’s electronic medical records portal for patients with records not available to [facility] via electronic transmission platforms from other institutions and labs. \par Time spend counseling and performing coordination of care was also included in determining the appropriate EM billing level.\par \par I have reviewed and verified information regarding the chief complaint and history recorded by the ancillary staff and/or the patient. I have independently reviewed and interpreted tests performed by other physicians and facilities as necessary. \par \par I have discussed with the patient differential diagnosis, reason for auxiliary tests if ordered, risks, benefits, alternatives, and complications of each form of therapy were discussed.\par

## 2022-12-27 ENCOUNTER — APPOINTMENT (OUTPATIENT)
Dept: NEUROLOGY | Facility: CLINIC | Age: 77
End: 2022-12-27

## 2023-02-08 ENCOUNTER — APPOINTMENT (OUTPATIENT)
Dept: ORTHOPEDIC SURGERY | Facility: CLINIC | Age: 78
End: 2023-02-08

## 2023-02-08 ENCOUNTER — APPOINTMENT (OUTPATIENT)
Age: 78
End: 2023-02-08

## 2024-06-04 ENCOUNTER — APPOINTMENT (OUTPATIENT)
Dept: ELECTROPHYSIOLOGY | Facility: CLINIC | Age: 79
End: 2024-06-04
Payer: MEDICAID

## 2024-06-04 VITALS
HEIGHT: 62 IN | DIASTOLIC BLOOD PRESSURE: 80 MMHG | WEIGHT: 210 LBS | SYSTOLIC BLOOD PRESSURE: 130 MMHG | BODY MASS INDEX: 38.64 KG/M2 | HEART RATE: 65 BPM

## 2024-06-04 DIAGNOSIS — I73.9 PERIPHERAL VASCULAR DISEASE, UNSPECIFIED: ICD-10-CM

## 2024-06-04 DIAGNOSIS — E03.9 HYPOTHYROIDISM, UNSPECIFIED: ICD-10-CM

## 2024-06-04 DIAGNOSIS — E11.9 TYPE 2 DIABETES MELLITUS W/OUT COMPLICATIONS: ICD-10-CM

## 2024-06-04 DIAGNOSIS — E78.5 HYPERLIPIDEMIA, UNSPECIFIED: ICD-10-CM

## 2024-06-04 DIAGNOSIS — I47.29 OTHER VENTRICULAR TACHYCARDIA: ICD-10-CM

## 2024-06-04 DIAGNOSIS — R42 DIZZINESS AND GIDDINESS: ICD-10-CM

## 2024-06-04 DIAGNOSIS — I10 ESSENTIAL (PRIMARY) HYPERTENSION: ICD-10-CM

## 2024-06-04 PROCEDURE — 93000 ELECTROCARDIOGRAM COMPLETE: CPT

## 2024-06-04 PROCEDURE — 99205 OFFICE O/P NEW HI 60 MIN: CPT | Mod: 25

## 2024-06-09 RX ORDER — BISOPROLOL FUMARATE 5 MG/1
5 TABLET, FILM COATED ORAL
Refills: 0 | Status: ACTIVE | COMMUNITY

## 2024-06-09 RX ORDER — AMLODIPINE BESYLATE 5 MG/1
5 TABLET ORAL DAILY
Refills: 0 | Status: ACTIVE | COMMUNITY

## 2024-06-09 NOTE — ADDENDUM
[FreeTextEntry1] : Nicole RODRIGUES assisted in documentation on 06/04/2024   acting as a scribe for Dr. Dominic Butcher.

## 2024-06-09 NOTE — END OF VISIT
[Time Spent: ___ minutes] : I have spent [unfilled] minutes of time on the encounter. [FreeTextEntry3] : I, Dominic Butcher, personally performed the services described in this documentation. All medical record entries made by the scribe/nurse CTA were at my direction and in my presence. I have reviewed the chart and agree that the record reflects my personal performance and is accurate and complete.

## 2024-06-09 NOTE — DISCUSSION/SUMMARY
[EKG obtained to assist in diagnosis and management of assessed problem(s)] : EKG obtained to assist in diagnosis and management of assessed problem(s) [FreeTextEntry1] : Ms. Selina Solares is a pleasant 78-year-old woman with hypertension, diabetes mellitus, hyperthyroidism, hyperlipidemia, obesity, intermittent lower extremity claudication, intermittent dizziness, and shortness of breath at rest and on exertion at times.    I recommend a nuclear stress test to evaluate for patient's cause of chest pain. I educated patient that if she has severe chest pain or pressure on her chest to go to the emergency room.    I recommend lower extremity arterial doppler and KATHY to assess for peripheral vascular disease.   I recommend carotid doppler to rule out carotid artery disease due to patient's dizziness.   I will reassess patient's symptoms after completion of vascular studies and nuclear stress test.    I recommend to continue the same medications.   If cardiac studies are negative, I will refer patient to pulmonary for PTFs and full evaluation.   I discussed with patient plan of care in great details. I answered all her questions to her satisfaction. Patient was pleased with the visit.  Patient will follow with me in 2 months' time. Please do not hesitate to contact me at [phone] if you have any further questions regarding this patient care.

## 2024-06-09 NOTE — CARDIOLOGY SUMMARY
[de-identified] : (06/04/2024): ECG. Sinus rhythm at 65 bpm, no significant ST/T wave abnormalities. [de-identified] :  (03/05/2024): 2D echo. Normal LV systolic function. EF 64%. Mild calcification of the aortic valve. No AS.

## 2024-06-09 NOTE — REASON FOR VISIT
[Arrhythmia/ECG Abnorrmalities] : arrhythmia/ECG abnormalities [FreeTextEntry3] : Dr. Amita Gautam  [FreeTextEntry1] : Daughter Linda

## 2024-06-09 NOTE — HISTORY OF PRESENT ILLNESS
[FreeTextEntry1] : Hypertension, diabetes mellitus, hypothyroidism, exertional dyspnea, lower extremity claudication, and dizziness.    Patient reports having multiple episodes of shortness of breath during the day, partially improved with inhalers, and having dyspnea on exertion. No recent change in pattern of dyspnea. She reports occasional non-exertional chest pain. She had a stress test done a few years ago which was negative. She feels tired all the time. She also reports having lower extremity pain during walking, especially in the calves bilaterally.    Patient has no angina, no syncope, and no palpitations. She presents for evaluation. Holter monitor showed brief NSVT.

## 2024-07-03 ENCOUNTER — OUTPATIENT (OUTPATIENT)
Dept: OUTPATIENT SERVICES | Facility: HOSPITAL | Age: 79
LOS: 1 days | End: 2024-07-03
Payer: MEDICAID

## 2024-07-03 ENCOUNTER — RESULT REVIEW (OUTPATIENT)
Age: 79
End: 2024-07-03

## 2024-07-03 DIAGNOSIS — Z90.710 ACQUIRED ABSENCE OF BOTH CERVIX AND UTERUS: Chronic | ICD-10-CM

## 2024-07-03 DIAGNOSIS — I47.29 OTHER VENTRICULAR TACHYCARDIA: ICD-10-CM

## 2024-07-03 PROCEDURE — 78452 HT MUSCLE IMAGE SPECT MULT: CPT

## 2024-07-03 PROCEDURE — 78452 HT MUSCLE IMAGE SPECT MULT: CPT | Mod: 26

## 2024-07-03 PROCEDURE — 93018 CV STRESS TEST I&R ONLY: CPT

## 2024-07-03 PROCEDURE — A9500: CPT

## 2024-07-04 DIAGNOSIS — I47.29 OTHER VENTRICULAR TACHYCARDIA: ICD-10-CM

## 2024-07-08 ENCOUNTER — APPOINTMENT (OUTPATIENT)
Dept: CARDIOLOGY | Facility: CLINIC | Age: 79
End: 2024-07-08
Payer: MEDICAID

## 2024-07-08 PROCEDURE — 93923 UPR/LXTR ART STDY 3+ LVLS: CPT

## 2024-07-08 PROCEDURE — 93925 LOWER EXTREMITY STUDY: CPT

## 2024-07-19 ENCOUNTER — OUTPATIENT (OUTPATIENT)
Dept: OUTPATIENT SERVICES | Facility: HOSPITAL | Age: 79
LOS: 1 days | End: 2024-07-19
Payer: MEDICAID

## 2024-07-19 DIAGNOSIS — M25.561 PAIN IN RIGHT KNEE: ICD-10-CM

## 2024-07-19 DIAGNOSIS — M25.562 PAIN IN LEFT KNEE: ICD-10-CM

## 2024-07-19 DIAGNOSIS — Z90.710 ACQUIRED ABSENCE OF BOTH CERVIX AND UTERUS: Chronic | ICD-10-CM

## 2024-07-19 PROCEDURE — 73562 X-RAY EXAM OF KNEE 3: CPT | Mod: 50

## 2024-07-19 PROCEDURE — 73562 X-RAY EXAM OF KNEE 3: CPT | Mod: 26,50

## 2024-07-20 DIAGNOSIS — M25.562 PAIN IN LEFT KNEE: ICD-10-CM

## 2024-07-20 DIAGNOSIS — M25.561 PAIN IN RIGHT KNEE: ICD-10-CM

## 2024-07-30 ENCOUNTER — APPOINTMENT (OUTPATIENT)
Dept: ELECTROPHYSIOLOGY | Facility: CLINIC | Age: 79
End: 2024-07-30
Payer: MEDICAID

## 2024-07-30 VITALS
BODY MASS INDEX: 38.83 KG/M2 | SYSTOLIC BLOOD PRESSURE: 130 MMHG | HEIGHT: 62 IN | RESPIRATION RATE: 14 BRPM | DIASTOLIC BLOOD PRESSURE: 80 MMHG | HEART RATE: 72 BPM | WEIGHT: 211 LBS

## 2024-07-30 DIAGNOSIS — I10 ESSENTIAL (PRIMARY) HYPERTENSION: ICD-10-CM

## 2024-07-30 DIAGNOSIS — E78.5 HYPERLIPIDEMIA, UNSPECIFIED: ICD-10-CM

## 2024-07-30 DIAGNOSIS — I47.29 OTHER VENTRICULAR TACHYCARDIA: ICD-10-CM

## 2024-07-30 DIAGNOSIS — R06.00 DYSPNEA, UNSPECIFIED: ICD-10-CM

## 2024-07-30 DIAGNOSIS — Z86.79 PERSONAL HISTORY OF OTHER DISEASES OF THE CIRCULATORY SYSTEM: ICD-10-CM

## 2024-07-30 PROCEDURE — 93000 ELECTROCARDIOGRAM COMPLETE: CPT

## 2024-07-30 PROCEDURE — 99214 OFFICE O/P EST MOD 30 MIN: CPT | Mod: 25

## 2024-07-30 NOTE — DISCUSSION/SUMMARY
[EKG obtained to assist in diagnosis and management of assessed problem(s)] : EKG obtained to assist in diagnosis and management of assessed problem(s) [FreeTextEntry1] : Ms. Selina Solares is a pleasant 78-year-old woman with hypertension, diabetes mellitus, hyperthyroidism, hyperlipidemia, obesity, intermittent lower extremity claudication, intermittent dizziness, and shortness of breath at rest and on exertion at times. Chest pain resolved.  Nuclear stress test low risk. I reviewed result with patient. I educated patient that if she has severe chest pain or pressure on her chest to go to the emergency room. I discussed with patient result of nuclear stress test and option of CTA vs cardiac cath. Patient is concerned due to contrast allergy. I will refer her to discuss it with interventional cardiology.   Arterial Doppler studies reviewed with patient.   I recommend to continue the same medications.   I will refer patient to pulmonary for PTFs and full evaluation.   I discussed with patient plan of care in great details. I answered all her questions to her satisfaction. Patient was pleased with the visit.  Patient will follow with me in 9 months' time. Please do not hesitate to contact me at [phone] if you have any further questions regarding this patient care.

## 2024-07-30 NOTE — CARDIOLOGY SUMMARY
[de-identified] : (07/30/2024): ECG. Sinus rhythm at 72 bpm, no significant ST/T wave abnormalities. (06/04/2024): ECG. Sinus rhythm at 65 bpm, no significant ST/T wave abnormalities. [de-identified] : (7/3/2024) Nuclear stress: . There is a small in size, mild in severity reversible perfusion defect affecting the basal to mid inferior wall segments. Findings may represent a small region of ischemia in the distribution of the right coronary artery. In the context of no associated wall motion abnormalities in the inferior wall, findings may also be consistent with diaphragmatic attenuation artifact. 2. Pharmacologic EKG stress testing is negative for ischemia with infusion of Lexiscan. 3. Normal left ventricular systolic function with an estimated ejection fraction of >65%. 4. This is a low risk positive stress test. [de-identified] : (03/05/2024): 2D echo. Normal LV systolic function. EF 64%. Mild calcification of the aortic valve. No AS.  [de-identified] : (7/8/2024) Arterial Doppler: 1. Right: Mild diffuse atherosclerosis noted in the right lower extremity arteries. Left: Mild diffuse atherosclerosis noted in the left lower extremity arteries. 2. Right and left KATHY normal. 3. No prior exam is available for comparison.

## 2024-08-21 ENCOUNTER — APPOINTMENT (OUTPATIENT)
Dept: PULMONOLOGY | Facility: CLINIC | Age: 79
End: 2024-08-21
Payer: MEDICAID

## 2024-08-21 ENCOUNTER — RESULT REVIEW (OUTPATIENT)
Age: 79
End: 2024-08-21

## 2024-08-21 VITALS
HEART RATE: 54 BPM | BODY MASS INDEX: 39.65 KG/M2 | WEIGHT: 210 LBS | DIASTOLIC BLOOD PRESSURE: 72 MMHG | HEIGHT: 61 IN | RESPIRATION RATE: 14 BRPM | OXYGEN SATURATION: 98 % | SYSTOLIC BLOOD PRESSURE: 132 MMHG

## 2024-08-21 DIAGNOSIS — R06.02 SHORTNESS OF BREATH: ICD-10-CM

## 2024-08-21 DIAGNOSIS — R07.81 PLEURODYNIA: ICD-10-CM

## 2024-08-21 PROCEDURE — G2211 COMPLEX E/M VISIT ADD ON: CPT | Mod: NC,1L

## 2024-08-21 PROCEDURE — 99204 OFFICE O/P NEW MOD 45 MIN: CPT

## 2024-08-21 RX ORDER — LEVALBUTEROL TARTRATE 45 UG/1
45 AEROSOL, METERED ORAL
Qty: 1 | Refills: 0 | Status: ACTIVE | COMMUNITY
Start: 2024-08-21 | End: 1900-01-01

## 2024-08-21 NOTE — REASON FOR VISIT
[Initial] : an initial visit [Shortness of Breath] : shortness of breath [Ad Hoc ] : provided by an ad hoc  [Interpreters_Relationshiptopatient] : daughter

## 2024-09-04 ENCOUNTER — RESULT REVIEW (OUTPATIENT)
Age: 79
End: 2024-09-04

## 2024-09-04 ENCOUNTER — APPOINTMENT (OUTPATIENT)
Dept: ORTHOPEDIC SURGERY | Facility: CLINIC | Age: 79
End: 2024-09-04
Payer: MEDICAID

## 2024-09-04 ENCOUNTER — OUTPATIENT (OUTPATIENT)
Dept: OUTPATIENT SERVICES | Facility: HOSPITAL | Age: 79
LOS: 1 days | End: 2024-09-04
Payer: MEDICAID

## 2024-09-04 DIAGNOSIS — M25.531 PAIN IN RIGHT WRIST: ICD-10-CM

## 2024-09-04 DIAGNOSIS — Z00.00 ENCOUNTER FOR GENERAL ADULT MEDICAL EXAMINATION WITHOUT ABNORMAL FINDINGS: ICD-10-CM

## 2024-09-04 DIAGNOSIS — M25.521 PAIN IN RIGHT ELBOW: ICD-10-CM

## 2024-09-04 DIAGNOSIS — M17.0 BILATERAL PRIMARY OSTEOARTHRITIS OF KNEE: ICD-10-CM

## 2024-09-04 DIAGNOSIS — Z90.710 ACQUIRED ABSENCE OF BOTH CERVIX AND UTERUS: Chronic | ICD-10-CM

## 2024-09-04 PROCEDURE — 73564 X-RAY EXAM KNEE 4 OR MORE: CPT | Mod: 26,50

## 2024-09-04 PROCEDURE — 73110 X-RAY EXAM OF WRIST: CPT | Mod: RT

## 2024-09-04 PROCEDURE — 73080 X-RAY EXAM OF ELBOW: CPT | Mod: RT

## 2024-09-04 PROCEDURE — 73564 X-RAY EXAM KNEE 4 OR MORE: CPT | Mod: 50

## 2024-09-04 PROCEDURE — 73090 X-RAY EXAM OF FOREARM: CPT | Mod: 26,RT

## 2024-09-04 PROCEDURE — 99212 OFFICE O/P EST SF 10 MIN: CPT

## 2024-09-04 PROCEDURE — 73090 X-RAY EXAM OF FOREARM: CPT | Mod: RT

## 2024-09-04 PROCEDURE — 73080 X-RAY EXAM OF ELBOW: CPT | Mod: 26,RT

## 2024-09-04 PROCEDURE — 73110 X-RAY EXAM OF WRIST: CPT | Mod: 26,RT

## 2024-09-04 RX ORDER — ACETAMINOPHEN 500 MG/1
500 TABLET, COATED ORAL
Qty: 60 | Refills: 1 | Status: ACTIVE | COMMUNITY
Start: 2024-09-04 | End: 1900-01-01

## 2024-09-04 RX ORDER — DICLOFENAC SODIUM 20 MG/G
40 SOLUTION TOPICAL
Qty: 1 | Refills: 3
Start: 2024-09-04

## 2024-09-04 RX ORDER — DICLOFENAC SODIUM 10 MG/G
1 GEL TOPICAL DAILY
Qty: 100 | Refills: 1 | Status: ACTIVE | COMMUNITY
Start: 2024-09-04 | End: 1900-01-01

## 2024-09-05 DIAGNOSIS — M12.9 ARTHROPATHY, UNSPECIFIED: ICD-10-CM
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
## 2024-09-20 ENCOUNTER — APPOINTMENT (OUTPATIENT)
Dept: PEDIATRIC ALLERGY IMMUNOLOGY | Facility: CLINIC | Age: 79
End: 2024-09-20
Payer: MEDICAID

## 2024-09-20 VITALS
BODY MASS INDEX: 39.27 KG/M2 | WEIGHT: 208 LBS | HEIGHT: 61 IN | SYSTOLIC BLOOD PRESSURE: 130 MMHG | DIASTOLIC BLOOD PRESSURE: 85 MMHG

## 2024-09-20 DIAGNOSIS — L50.9 URTICARIA, UNSPECIFIED: ICD-10-CM

## 2024-09-20 DIAGNOSIS — H10.13 ACUTE ATOPIC CONJUNCTIVITIS, BILATERAL: ICD-10-CM

## 2024-09-20 DIAGNOSIS — B35.6 TINEA CRURIS: ICD-10-CM

## 2024-09-20 DIAGNOSIS — E03.9 HYPOTHYROIDISM, UNSPECIFIED: ICD-10-CM

## 2024-09-20 DIAGNOSIS — Z88.9 ALLERGY STATUS TO UNSPECIFIED DRUGS, MEDICAMENTS AND BIOLOGICAL SUBSTANCES: ICD-10-CM

## 2024-09-20 DIAGNOSIS — L25.9 UNSPECIFIED CONTACT DERMATITIS, UNSPECIFIED CAUSE: ICD-10-CM

## 2024-09-20 DIAGNOSIS — J30.1 ALLERGIC RHINITIS DUE TO POLLEN: ICD-10-CM

## 2024-09-20 PROCEDURE — 99204 OFFICE O/P NEW MOD 45 MIN: CPT

## 2024-09-20 RX ORDER — FEXOFENADINE HYDROCHLORIDE 180 MG/1
180 TABLET ORAL
Qty: 30 | Refills: 2 | Status: ACTIVE | COMMUNITY
Start: 2024-09-20 | End: 1900-01-01

## 2024-09-20 RX ORDER — AZELASTINE HYDROCHLORIDE 0.5 MG/ML
0.05 SOLUTION/ DROPS OPHTHALMIC TWICE DAILY
Qty: 1 | Refills: 2 | Status: ACTIVE | COMMUNITY
Start: 2024-09-20 | End: 1900-01-01

## 2024-09-20 RX ORDER — AZELASTINE HYDROCHLORIDE 137 UG/1
0.1 SPRAY, METERED NASAL
Qty: 1 | Refills: 5 | Status: ACTIVE | COMMUNITY
Start: 2024-09-20 | End: 1900-01-01

## 2024-09-20 NOTE — ASSESSMENT
[FreeTextEntry1] : 1. Urticaria - trial fexofenadine 180mg - likely secondary to thyroid or medications  2. AR/AC - can check immunocap to environmental allergens - Azelastine nasal + fexodenadine 180mg + azelastin opthalmic  3. SOB - continue pulmonary f/u  4. Tinea Cruris vs CD - recommend Derm eval.  5. DA - avoid sulfa drugs and anesthetics she has had an issue with before. If they have concerns she will have to go to an academic center for evaluation.

## 2024-09-20 NOTE — HISTORY OF PRESENT ILLNESS
[Eczematous rashes] : eczematous rashes [Venom Reactions] : venom reactions [Food Allergies] : food allergies [de-identified] : THOMAS HUERTA  is a 78 year old female who has a history of seasonal allergies, in the spring she experiences itch, watery eyes, and sneezing, she says she constantly rubs her eyes, she does not take any allergic medication, she has noticed when someone is cooking, she begins to feel an itchy throat, she cannot tell if this is allergy related or not. She complains of itchiness, she says she breaks out in a rash randomly, she notices mosquito bites swell up, she wants to know if she is allergic. She says for several years she felt itchiness under her breasts, she would scratch herself and now has opened skin. She says she is allergic to various medications, cortisone, sulfur, and anesthesia, on one occasion she went into anaphylaxis after taking various medications, her throat closed and face swelled up. Before COVID-19 she began to cough and have shortness of breath after COVID these symptoms worsened.

## 2024-09-20 NOTE — SOCIAL HISTORY
[House] : [unfilled] lives in a house  [Central Forced Air] : heating provided by central forced air [Central] : air conditioning provided by central unit [Dry] : dry [Bedroom] :  in bedroom [Living Area] : in living area [None] : none [Humidifier] : does not use a humidifier [Dehumidifier] : does not use a dehumidifier [Cockroaches] : Patient states that there are no cockroaches in the home [Dust Mite Covers] : does not have dust mite covers [Feather Pillows] : does not have feather pillows [Feather Comforter] : does not have a feather comforter [Basement] : not in the basement [Smokers in Household] : there are no smokers in the home [de-identified] : n/a

## 2024-10-09 ENCOUNTER — APPOINTMENT (OUTPATIENT)
Dept: ORTHOPEDIC SURGERY | Facility: CLINIC | Age: 79
End: 2024-10-09

## 2024-10-10 ENCOUNTER — APPOINTMENT (OUTPATIENT)
Dept: PULMONOLOGY | Facility: CLINIC | Age: 79
End: 2024-10-10

## 2024-11-04 ENCOUNTER — NON-APPOINTMENT (OUTPATIENT)
Age: 79
End: 2024-11-04

## 2024-11-06 ENCOUNTER — NON-APPOINTMENT (OUTPATIENT)
Age: 79
End: 2024-11-06

## 2024-11-06 ENCOUNTER — OUTPATIENT (OUTPATIENT)
Dept: OUTPATIENT SERVICES | Facility: HOSPITAL | Age: 79
LOS: 1 days | End: 2024-11-06
Payer: MEDICAID

## 2024-11-06 DIAGNOSIS — M54.59 OTHER LOW BACK PAIN: ICD-10-CM

## 2024-11-06 DIAGNOSIS — Z90.710 ACQUIRED ABSENCE OF BOTH CERVIX AND UTERUS: Chronic | ICD-10-CM

## 2024-11-06 DIAGNOSIS — M25.512 PAIN IN LEFT SHOULDER: ICD-10-CM

## 2024-11-06 PROCEDURE — 73030 X-RAY EXAM OF SHOULDER: CPT | Mod: 26,LT

## 2024-11-06 PROCEDURE — 72050 X-RAY EXAM NECK SPINE 4/5VWS: CPT

## 2024-11-06 PROCEDURE — 72072 X-RAY EXAM THORAC SPINE 3VWS: CPT

## 2024-11-06 PROCEDURE — 72072 X-RAY EXAM THORAC SPINE 3VWS: CPT | Mod: 26

## 2024-11-06 PROCEDURE — 73030 X-RAY EXAM OF SHOULDER: CPT | Mod: LT

## 2024-11-06 PROCEDURE — 72050 X-RAY EXAM NECK SPINE 4/5VWS: CPT | Mod: 26

## 2024-11-07 DIAGNOSIS — M54.59 OTHER LOW BACK PAIN: ICD-10-CM

## 2024-11-07 DIAGNOSIS — M25.512 PAIN IN LEFT SHOULDER: ICD-10-CM

## 2025-09-19 ENCOUNTER — APPOINTMENT (OUTPATIENT)
Dept: ELECTROPHYSIOLOGY | Facility: CLINIC | Age: 80
End: 2025-09-19